# Patient Record
Sex: FEMALE | Race: WHITE | NOT HISPANIC OR LATINO | Employment: OTHER | URBAN - NONMETROPOLITAN AREA
[De-identification: names, ages, dates, MRNs, and addresses within clinical notes are randomized per-mention and may not be internally consistent; named-entity substitution may affect disease eponyms.]

---

## 2017-01-30 ENCOUNTER — TELEPHONE (OUTPATIENT)
Dept: OPHTHALMOLOGY | Facility: CLINIC | Age: 82
End: 2017-01-30

## 2017-01-30 DIAGNOSIS — H40.1130 PRIMARY OPEN ANGLE GLAUCOMA OF BOTH EYES, UNSPECIFIED GLAUCOMA STAGE: Primary | ICD-10-CM

## 2017-01-30 RX ORDER — LATANOPROST 50 UG/ML
1 SOLUTION/ DROPS OPHTHALMIC NIGHTLY
Qty: 2.5 ML | Refills: 11 | Status: SHIPPED | OUTPATIENT
Start: 2017-01-30

## 2017-08-23 ENCOUNTER — OFFICE VISIT (OUTPATIENT)
Dept: CARDIOLOGY | Facility: CLINIC | Age: 82
End: 2017-08-23

## 2017-08-23 VITALS
HEART RATE: 71 BPM | DIASTOLIC BLOOD PRESSURE: 90 MMHG | WEIGHT: 180 LBS | SYSTOLIC BLOOD PRESSURE: 140 MMHG | BODY MASS INDEX: 29.99 KG/M2 | HEIGHT: 65 IN

## 2017-08-23 DIAGNOSIS — I10 ESSENTIAL HYPERTENSION: Primary | ICD-10-CM

## 2017-08-23 DIAGNOSIS — E78.00 PURE HYPERCHOLESTEROLEMIA: ICD-10-CM

## 2017-08-23 PROCEDURE — 99213 OFFICE O/P EST LOW 20 MIN: CPT | Performed by: INTERNAL MEDICINE

## 2017-08-23 RX ORDER — CLOPIDOGREL BISULFATE 75 MG/1
75 TABLET ORAL DAILY
Qty: 30 TABLET | Refills: 11 | Status: SHIPPED | OUTPATIENT
Start: 2017-08-23 | End: 2018-08-23 | Stop reason: SDUPTHER

## 2017-08-23 RX ORDER — ASPIRIN 81 MG/1
81 TABLET, CHEWABLE ORAL DAILY
COMMUNITY
End: 2022-06-07

## 2017-08-23 NOTE — PROGRESS NOTES
[facility] Cardiology  OFFICE NOTE    Karen Conteh  89 y.o. female    08/23/2017  1. Essential hypertension    2. Pure hypercholesterolemia        Chief complaint -Generalized aches and pains with problems with cervical spondylosis and radiating pain to the left side of the chest      History of present Illness- 89-year-old lady who has generalized arthritis with cervical spondylosis has aches and pains all over the body.  She had a cardiac catheterization 2008 which showed noncritical coronary disease.  She is on multiple medicines including hydrocodone for her severe arthritis.  Her pain is mainly coming from the back radiating to the left side lower side of the left breast.  It is not associated with activity does predominantly when she moves in certain positions and it also sometimes radiates to her neck and also to the head like a neuropathy type of pain.              Allergies   Allergen Reactions   • Spironolactone    • Tetracyclines & Related      SWEATS         Past Medical History:   Diagnosis Date   • Achilles tendinitis    • Acute atopic conjunctivitis    • Artificial lens in position    • Calcaneal spur    • Equinus contracture of the ankle    • Essential hypertension    • Fibrocystic disease of breast    • Gastroesophageal reflux disease    • Glaucoma    • Hypernatremia    • Keratoconjunctivitis sicca    • Myopia    • needs influenza immunization    • Nonexudative age-related macular degeneration    • Nuclear cataract    • Osteoporosis    • Plantar fasciitis    • Primary open angle glaucoma    • Pseudophakia    • 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 left eye   • ENDOSCOPY      Dysphagia, non-obstructive. Gastroesophageal reflux without esophagitis. Erythema of the arytenoid folds. Chronic gastritis without bleeding   • REPLACEMENT  TOTAL KNEE     • TOTAL ABDOMINAL HYSTERECTOMY WITH SALPINGO OOPHORECTOMY      Dilatation and curettage; total abdominal hysterectomy; bilateral salpingo-oophorectomy and appendectomy         Family History   Problem Relation Age of Onset   • Cancer Other    • Diabetes Other    • Heart disease Other    • Hypertension Other          Social History     Social History   • Marital status:      Spouse name: N/A   • Number of children: N/A   • Years of education: N/A     Occupational History   • Not on file.     Social History Main Topics   • Smoking status: Unknown If Ever Smoked   • Smokeless tobacco: Never Used   • Alcohol use No   • Drug use: No   • Sexual activity: Defer     Other Topics Concern   • Not on file     Social History Narrative         Current Outpatient Prescriptions   Medication Sig Dispense Refill   • aspirin 81 MG chewable tablet Chew 81 mg Daily.     • Calcium Carbonate Antacid (TUMS PO) Take 2 tablets by mouth as needed.     • Calcium Carbonate-Vitamin D (CALTRATE 600+D PO) Take 1 tablet by mouth 2 (two) times a day.     • diphenhydrAMINE (BENADRYL) 25 mg capsule Take 25 mg by mouth daily as needed for itching.     • escitalopram (LEXAPRO) 10 MG tablet Take 10 mg by mouth every night.     • ferrous sulfate 325 (65 FE) MG tablet Take 325 mg by mouth daily.     • fluticasone (FLONASE) 50 MCG/ACT nasal spray into each nostril. Administer 2 sprays in each nostril for each dose.     • HYDROcodone-acetaminophen (NORCO) 7.5-325 MG per tablet Take 1 tablet by mouth every 3 (three) hours as needed for moderate pain (4-6).     • latanoprost (XALATAN) 0.005 % ophthalmic solution Administer 1 drop to both eyes Every Night. 2.5 mL 11   • levothyroxine (SYNTHROID, LEVOTHROID) 25 MCG tablet Take 25 mcg by mouth daily.     • LORazepam (ATIVAN) 1 MG tablet Take 1 mg by mouth 2 (two) times a day as needed for anxiety.     • olopatadine (PATADAY) 0.2 % solution ophthalmic solution 1 drop daily.     • pravastatin  "(PRAVACHOL) 20 MG tablet Take 20 mg by mouth every night.     • Psyllium (METAMUCIL PO) Take  by mouth as needed.     • ranitidine (ZANTAC) 150 MG tablet Take 150 mg by mouth 2 (two) times a day.     • valsartan (DIOVAN) 80 MG tablet Take 80 mg by mouth daily.     • clopidogrel (PLAVIX) 75 MG tablet Take 1 tablet by mouth Daily. 30 tablet 11     No current facility-administered medications for this visit.          Review of Systems     Constitution: Denies any fatigue, fever or chills    HENT: Has hearing impairment and uses hearing aids    Eyes: Denies any blurring of vision, or photophobia     Cardivascular - As per history of present illness     Respiratory system-Shortness of breath       Endocrine:  Hyperlipidemia and hypothyroidism with osteoporosis       Musculoskeletal:  history of arthritis generalized    Gastrointestinal: No nausea, vomiting, or melena    Genitourinary: No dysuria or hematuria    Neurological:   No history of seizure disorder, stroke, memory problems    Psychiatric/Behavioral:        No history of depression,  No history of bipolar disorder or schizophrenia     Hematological- no history of easy bruising or any bleeding diathesis            OBJECTIVE    /90  Pulse 71  Ht 65\" (165.1 cm)  Wt 180 lb (81.6 kg)  BMI 29.95 kg/m2      Physical Exam     Constitutional: is oriented to person, place, and time.     Skin-warm and dry    Well developed and nourished in no acute distress      Head: Normocephalic and atraumatic.     Neck: Neck supple. No bruit in the carotids,    Cardiovascular: [place] in the fifth intercostal space   Regular rate, and  Rhythm,    S1 greater than S2, no S3 or S4, no gallop     Pulmonary/Chest:   Air  Entry is equal on both sides  No wheezing or crackles,      Abdominal: Soft.  No hepatosplenomegaly, bowel sounds are present    Musculoskeletal: No kyphoscoliosis, no significant thickening of the joints    Neurological: is alert and oriented to person, place, and " time.    cranial nerve are intact .   No motor or sensory deficit    Extremities-no edema, no radial femoral delay      Psychiatric: He has a normal mood and affect.                  His behavior is normal.           Procedures                   A/P    Hypertension well controlled with Diovan 80 mg and takes pravastatin for hyperlipidemia.    Hypothyroidism on Synthroid supplements.  She has generalized aches and pains with radiating pain from the neck and back, considering her advanced age and since no evidence of unstable angina symptoms more possibly related to arthritis I discussed with her daughter and plan for more of conservative medical therapy.  And going to refer her to physical therapy for her aches and pains in the joints.    She is on aspirin and statins along with Plavix.    Follow-up in 1 year              This document has been electronically signed by Addy Lucas MD on August 23, 2017 2:20 PM       EMR Dragon/Transcription disclaimer:   Some of this note may be an electronic transcription/translation of spoken language to printed text. The electronic translation of spoken language may permit erroneous, or at times, nonsensical words or phrases to be inadvertently transcribed; Although I have reviewed the note for such errors, some may still exist.

## 2017-10-30 ENCOUNTER — CLINICAL SUPPORT (OUTPATIENT)
Dept: AUDIOLOGY | Facility: CLINIC | Age: 82
End: 2017-10-30

## 2017-10-30 DIAGNOSIS — Z46.1 ENCOUNTER FOR FITTING OR ADJUSTMENT OF HEARING AID: Primary | ICD-10-CM

## 2017-10-30 PROCEDURE — HEARINGNOCHG: Performed by: AUDIOLOGIST

## 2017-10-31 NOTE — PROGRESS NOTES
HEARING AID CHECK    Name:  Karen Conteh  :  1928  Age:  89 y.o.  Date of Evaluation:  10/31/2017      HISTORY    Reason for visit:  Karen Conteh is seen today for a hearing aid check.  Patient reports that her left hearing aid isn't sounding as loud as it used to.    Hearing aid history:  Patient is currently wearing a [place] (ITC) hearing aid in left ear(s).     OFFICE VISIT    During today's visit the hearing aid was cleaned and checked.  A new wax trap was put on the device.  The patient's hearing aid volume was increased overall about 6 steps and MPO was also increased.  Patient was informed that her hearing aid may not be adjusted much louder that it currently is as its close to maximum volume.  Otoscopy was completed and it was noted that her right ear had cerumen impaction.  It was suggested that the patient follow-up with an ENT for cerumen removal.  The patient will also follow-up with audiology that day for a hearing test and to adjust the hearing aid to her new test.    It was a pleasure seeing Karen Conteh in Audiology today.  It is a pleasure helping Ms. Conteh with her amplification needs.              This document has been electronically signed by GIOVANA Mai on 2017 8:42 AM        GIOVANA Mai  Licensed Audiologist    For Billing and Codin  Hearing Aid Check, Monaural - no charge

## 2017-11-20 ENCOUNTER — OFFICE VISIT (OUTPATIENT)
Dept: OTOLARYNGOLOGY | Facility: CLINIC | Age: 82
End: 2017-11-20

## 2017-11-20 ENCOUNTER — CLINICAL SUPPORT (OUTPATIENT)
Dept: AUDIOLOGY | Facility: CLINIC | Age: 82
End: 2017-11-20

## 2017-11-20 VITALS — HEIGHT: 65 IN | WEIGHT: 189 LBS | BODY MASS INDEX: 31.49 KG/M2 | TEMPERATURE: 97.9 F

## 2017-11-20 DIAGNOSIS — H61.21 IMPACTED CERUMEN OF RIGHT EAR: ICD-10-CM

## 2017-11-20 DIAGNOSIS — H90.3 SENSORINEURAL HEARING LOSS, BILATERAL: Primary | ICD-10-CM

## 2017-11-20 DIAGNOSIS — H90.3 SENSORINEURAL HEARING LOSS OF BOTH EARS: Primary | ICD-10-CM

## 2017-11-20 PROCEDURE — 69210 REMOVE IMPACTED EAR WAX UNI: CPT | Performed by: OTOLARYNGOLOGY

## 2017-11-20 PROCEDURE — 99203 OFFICE O/P NEW LOW 30 MIN: CPT | Performed by: OTOLARYNGOLOGY

## 2017-11-20 RX ORDER — LOTEPREDNOL ETABONATE 5 MG/G
GEL OPHTHALMIC 4 TIMES DAILY
COMMUNITY
End: 2018-01-23

## 2017-11-20 RX ORDER — LANOLIN ALCOHOL/MO/W.PET/CERES
1000 CREAM (GRAM) TOPICAL DAILY
Status: ON HOLD | COMMUNITY
End: 2018-01-30

## 2017-11-20 RX ORDER — CLOTRIMAZOLE AND BETAMETHASONE DIPROPIONATE 10; .64 MG/G; MG/G
CREAM TOPICAL 2 TIMES DAILY
COMMUNITY
End: 2018-11-26

## 2017-11-20 RX ORDER — DEXLANSOPRAZOLE 60 MG/1
CAPSULE, DELAYED RELEASE ORAL
COMMUNITY
Start: 2017-08-25 | End: 2018-01-23

## 2017-11-20 NOTE — PROGRESS NOTES
Subjective   Karen Conteh is a 89 y.o. female.   CC: hearing loss and cerumen impaction  History of Present Illness   Patient has many years of hearing loss is noted to have cerumen impaction she's been provided with a hearing aid in her left ear comes in for hearing testing.  It's unclear from her history as to why this 89-year-old patient has her hearing loss but apparently spell worked up and that within the past.  She's not complaining of dizziness or tinnitus currently..  No recent infections no known history of ear surgery or head trauma she's very hard of hearing is hard to get a history in terms of ototoxic drugs of that sort of thing.  The hearing loss is been chronic and severe for a long time      The following portions of the patient's history were reviewed and updated as appropriate: allergies, current medications, past family history, past medical history, past social history, past surgical history and problem list.      Karen Conteh reports that she has never smoked. She has never used smokeless tobacco. She reports that she does not drink alcohol or use illicit drugs.  Patient is not a tobacco user and has not been counseled for use of tobacco products    Family History   Problem Relation Age of Onset   • Cancer Other    • Diabetes Other    • Heart disease Other    • Hypertension Other          Current Outpatient Prescriptions:   •  aspirin 81 MG chewable tablet, Chew 81 mg Daily., Disp: , Rfl:   •  clopidogrel (PLAVIX) 75 MG tablet, Take 1 tablet by mouth Daily., Disp: 30 tablet, Rfl: 11  •  clotrimazole-betamethasone (LOTRISONE) 1-0.05 % cream, Apply  topically 2 (Two) Times a Day., Disp: , Rfl:   •  dexlansoprazole (DEXILANT) 60 MG capsule, TAKE (1) CAPSULE BY MOUTH ONCE DAILY IF NEEDED., Disp: , Rfl:   •  escitalopram (LEXAPRO) 10 MG tablet, Take 10 mg by mouth every night., Disp: , Rfl:   •  ferrous sulfate 325 (65 FE) MG tablet, Take 325 mg by mouth daily., Disp: , Rfl:   •  fluticasone  (FLONASE) 50 MCG/ACT nasal spray, into each nostril. Administer 2 sprays in each nostril for each dose., Disp: , Rfl:   •  latanoprost (XALATAN) 0.005 % ophthalmic solution, Administer 1 drop to both eyes Every Night., Disp: 2.5 mL, Rfl: 11  •  levothyroxine (SYNTHROID, LEVOTHROID) 25 MCG tablet, Take 25 mcg by mouth daily., Disp: , Rfl:   •  LORazepam (ATIVAN) 1 MG tablet, Take 1 mg by mouth 2 (two) times a day as needed for anxiety., Disp: , Rfl:   •  loteprednol etabonate (LOTEMAX) 0.5 % gel ophthalmic gel, Apply  to eye 4 (Four) Times a Day., Disp: , Rfl:   •  Multiple Vitamins-Minerals (MULTIVITAMIN ADULT PO), Take  by mouth., Disp: , Rfl:   •  olopatadine (PATADAY) 0.2 % solution ophthalmic solution, 1 drop daily., Disp: , Rfl:   •  pravastatin (PRAVACHOL) 20 MG tablet, Take 20 mg by mouth every night., Disp: , Rfl:   •  ranitidine (ZANTAC) 150 MG tablet, Take 150 mg by mouth 2 (two) times a day., Disp: , Rfl:   •  valsartan (DIOVAN) 80 MG tablet, Take 80 mg by mouth daily., Disp: , Rfl:   •  vitamin B-12 (CYANOCOBALAMIN) 1000 MCG tablet, Take 1,000 mcg by mouth Daily., Disp: , Rfl:     Allergies   Allergen Reactions   • Spironolactone    • Tetracyclines & Related      SWEATS       Past Medical History:   Diagnosis Date   • Achilles tendinitis    • Acute atopic conjunctivitis    • Artificial lens in position    • Calcaneal spur    • Equinus contracture of the ankle    • Essential hypertension    • Fibrocystic disease of breast    • Gastroesophageal reflux disease    • Glaucoma    • Hypernatremia    • Keratoconjunctivitis sicca    • Myopia    • needs influenza immunization    • Nonexudative age-related macular degeneration    • Nuclear cataract    • Osteoporosis    • Plantar fasciitis    • Primary open angle glaucoma    • Pseudophakia    • Stroke     ministrokes   • 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   HENT: Positive for hearing loss  and tinnitus. Negative for ear discharge and ear pain.    Neurological: Negative for dizziness.   Hematological: Negative for adenopathy.   All other systems reviewed and are negative.          Objective   Physical Exam   Constitutional: She is oriented to person, place, and time. She appears well-developed and well-nourished.   HENT:   Head: Normocephalic and atraumatic.   Right Ear: Hearing, tympanic membrane and external ear normal.   Left Ear: Hearing, tympanic membrane and external ear normal.   Ears:    Nose: Nose normal. No mucosal edema, rhinorrhea, nasal deformity or septal deviation. No epistaxis. Right sinus exhibits no maxillary sinus tenderness and no frontal sinus tenderness. Left sinus exhibits no maxillary sinus tenderness and no frontal sinus tenderness.   Mouth/Throat: Uvula is midline, oropharynx is clear and moist and mucous membranes are normal. No trismus in the jaw. Normal dentition. No oropharyngeal exudate or posterior oropharyngeal edema. Tonsils are 1+ on the right. Tonsils are 1+ on the left. No tonsillar exudate.   Eyes: Conjunctivae are normal.   Neck: Normal range of motion. Neck supple. No JVD present. No tracheal deviation present. No thyromegaly present.   Cardiovascular: Normal rate.    Pulmonary/Chest: Effort normal.   Musculoskeletal: Normal range of motion.   Lymphadenopathy:        Head (right side): No submental, no submandibular, no tonsillar, no preauricular, no posterior auricular and no occipital adenopathy present.        Head (left side): No submental, no submandibular, no tonsillar, no preauricular, no posterior auricular and no occipital adenopathy present.     She has no cervical adenopathy.        Right cervical: No superficial cervical, no deep cervical and no posterior cervical adenopathy present.       Left cervical: No superficial cervical, no deep cervical and no posterior cervical adenopathy present.   Neurological: She is alert and oriented to person, place,  and time. No cranial nerve deficit.   Skin: Skin is warm.   Psychiatric: She has a normal mood and affect. Her speech is normal and behavior is normal. Thought content normal.   Nursing note and vitals reviewed.      Procedure note cerumen impaction cleaning was done in both ears to the right was much worse in the left large amount of wax removed could not be removed the suction to multiple forceps were used with microscopic control in the ear is cleaned atraumatically without evidence complications.  The audiogram and tympanogram were reviewed with the patient and her family with near total deafness and sensorineural hearing loss and tympanograms were shown tracings explained as well.    Assessment/Plan   Karen was seen today for cerumen impaction.    Diagnoses and all orders for this visit:    Sensorineural hearing loss of both ears    Impacted cerumen of right ear      We discussed strategies were preventing wax buildup especially in light of her severe sensorineural hearing loss.  She was to continue using hearing aid.  The only other options would be  a cochlear implant but since she's functioning well with area of that would be something as a last resort.  She is to avoid loud noises.    See her back in 6 months if there is any problems or questions or to let us know all questions were answered and she her family agree with this approach.

## 2017-11-21 NOTE — PROGRESS NOTES
STANDARD AUDIOMETRIC EVALUATION      Name:  Karen Conteh  :  1928  Age:  89 y.o.  Date of Evaluation:  2017      HISTORY    Reason for visit:  Karen Conteh is seen today for a hearing evaluation at the request of Dr. Brent Diaz.  Patient reports that she is having more trouble hearing.      EVALUATION    See Audiogram    RESULTS        Otoscopy and Tympanometry 226 Hz :  Right Ear:  Otoscopy:  Testing completed after ears were cleaned          Tympanometry:  Middle ear function within normal limits    Left Ear:   Otoscopy:  Testing completed after ears were cleaned        Tympanometry:  Middle ear function within normal limits    Test technique:  Standard Audiometry     Pure Tone Audiometry:   Patient responded to pure tones at 40-95 dB for 250-8000 Hz in right ear, and at 40-85 dB for 250-8000 Hz in left ear.       Speech Audiometry:        Right Ear: Speech Detection Threshold was recorded at 75 dB.  Speech Discrimination was unable to be completed due to patient's inability to understand speech.                       Left Ear:  Speech Reception Threshold (SRT) was obtained at 55 dBHL                 Speech Discrimination scores were 16% in quiet when words were presented at 85 dBHL    Reliability:   good    IMPRESSIONS:  1.  Tympanometry results are consistent with Middle ear function within normal limits in both ears.  2.  Pure tone results are consistent with mild to severe sloping sensorineural hearing loss for both ears.       RECOMMENDATIONS:  Patient is seeing the Ear Nose and Throat physician immediately following this examination.  It was a pleasure seeing Karen Conteh in Audiology today.  We would be happy to do further testing or discuss these test as necessary.          This document has been electronically signed by GIOVANA Mai on 2017 2:15 PM          GIOVANA Mai  Licensed Audiologist

## 2018-01-23 RX ORDER — TIMOLOL MALEATE 5 MG/ML
1 SOLUTION/ DROPS OPHTHALMIC DAILY
COMMUNITY

## 2018-01-30 ENCOUNTER — ANESTHESIA EVENT (OUTPATIENT)
Dept: GASTROENTEROLOGY | Facility: HOSPITAL | Age: 83
End: 2018-01-30

## 2018-01-30 ENCOUNTER — HOSPITAL ENCOUNTER (OUTPATIENT)
Facility: HOSPITAL | Age: 83
Setting detail: HOSPITAL OUTPATIENT SURGERY
Discharge: HOME | End: 2018-01-30
Attending: INTERNAL MEDICINE | Admitting: INTERNAL MEDICINE

## 2018-01-30 ENCOUNTER — ANESTHESIA (OUTPATIENT)
Dept: GASTROENTEROLOGY | Facility: HOSPITAL | Age: 83
End: 2018-01-30

## 2018-01-30 VITALS
HEART RATE: 57 BPM | DIASTOLIC BLOOD PRESSURE: 82 MMHG | SYSTOLIC BLOOD PRESSURE: 138 MMHG | TEMPERATURE: 96.3 F | OXYGEN SATURATION: 96 % | WEIGHT: 185 LBS | HEIGHT: 65 IN | BODY MASS INDEX: 30.82 KG/M2 | RESPIRATION RATE: 18 BRPM

## 2018-01-30 PROCEDURE — 25010000002 PROPOFOL 10 MG/ML EMULSION: Performed by: NURSE ANESTHETIST, CERTIFIED REGISTERED

## 2018-01-30 RX ORDER — ONDANSETRON 2 MG/ML
4 INJECTION INTRAMUSCULAR; INTRAVENOUS ONCE AS NEEDED
Status: DISCONTINUED | OUTPATIENT
Start: 2018-01-30 | End: 2018-01-30 | Stop reason: HOSPADM

## 2018-01-30 RX ORDER — PROMETHAZINE HYDROCHLORIDE 25 MG/1
25 SUPPOSITORY RECTAL ONCE AS NEEDED
Status: DISCONTINUED | OUTPATIENT
Start: 2018-01-30 | End: 2018-01-30 | Stop reason: HOSPADM

## 2018-01-30 RX ORDER — PROMETHAZINE HYDROCHLORIDE 25 MG/ML
12.5 INJECTION, SOLUTION INTRAMUSCULAR; INTRAVENOUS ONCE AS NEEDED
Status: DISCONTINUED | OUTPATIENT
Start: 2018-01-30 | End: 2018-01-30 | Stop reason: HOSPADM

## 2018-01-30 RX ORDER — DEXTROSE AND SODIUM CHLORIDE 5; .45 G/100ML; G/100ML
20 INJECTION, SOLUTION INTRAVENOUS CONTINUOUS
Status: DISCONTINUED | OUTPATIENT
Start: 2018-01-30 | End: 2018-01-30 | Stop reason: HOSPADM

## 2018-01-30 RX ORDER — PROMETHAZINE HYDROCHLORIDE 25 MG/1
25 TABLET ORAL ONCE AS NEEDED
Status: DISCONTINUED | OUTPATIENT
Start: 2018-01-30 | End: 2018-01-30 | Stop reason: HOSPADM

## 2018-01-30 RX ORDER — LIDOCAINE HYDROCHLORIDE 10 MG/ML
INJECTION, SOLUTION INFILTRATION; PERINEURAL AS NEEDED
Status: DISCONTINUED | OUTPATIENT
Start: 2018-01-30 | End: 2018-01-30 | Stop reason: SURG

## 2018-01-30 RX ORDER — PROPOFOL 10 MG/ML
VIAL (ML) INTRAVENOUS AS NEEDED
Status: DISCONTINUED | OUTPATIENT
Start: 2018-01-30 | End: 2018-01-30 | Stop reason: SURG

## 2018-01-30 RX ADMIN — PROPOFOL 20 MG: 10 INJECTION, EMULSION INTRAVENOUS at 08:29

## 2018-01-30 RX ADMIN — DEXTROSE AND SODIUM CHLORIDE 20 ML/HR: 5; 450 INJECTION, SOLUTION INTRAVENOUS at 08:13

## 2018-01-30 RX ADMIN — LIDOCAINE HYDROCHLORIDE 60 MG: 10 INJECTION, SOLUTION INFILTRATION; PERINEURAL at 08:27

## 2018-01-30 RX ADMIN — PROPOFOL 10 MG: 10 INJECTION, EMULSION INTRAVENOUS at 08:27

## 2018-01-30 RX ADMIN — PROPOFOL 70 MG: 10 INJECTION, EMULSION INTRAVENOUS at 08:23

## 2018-01-30 NOTE — ANESTHESIA PREPROCEDURE EVALUATION
Anesthesia Evaluation     NPO Solid Status: > 8 hours  NPO Liquid Status: > 8 hours     Airway   no difficulty expected  Dental      Pulmonary    Cardiovascular     (+) hypertension well controlled, PVD,       Neuro/Psych  (+) CVA,     GI/Hepatic/Renal/Endo    (+)  GERD well controlled,     Musculoskeletal     Abdominal    Substance History      OB/GYN          Other                                                Anesthesia Plan    ASA 3     MAC     intravenous induction   Anesthetic plan and risks discussed with patient.    Plan discussed with CRNA.

## 2018-01-30 NOTE — ANESTHESIA POSTPROCEDURE EVALUATION
Patient: Karen Conteh    Procedure Summary     Date Anesthesia Start Anesthesia Stop Room / Location    01/30/18 0823 0838 [facility] ENDOSCOPY 2 / [facility] ENDOSCOPY       Procedure Diagnosis Surgeon Provider    ESOPHAGOGASTRODUODENOSCOPY (N/A Esophagus) Esophageal stenosis; Hiatal hernia; Gastritis  (Esophageal stenosis [K22.2]; Diffuse esophageal spasm [K22.4]) Jude Hewitt, DO Michi Howard CRNA          Anesthesia Type: MAC  Last vitals  BP   (!) 201/95 (01/30/18 0756)   Temp   96.8 °F (36 °C) (01/30/18 0756)   Pulse   100 (01/30/18 0756)   Resp   20 (01/30/18 0756)     SpO2   93 % (01/30/18 0756)     Post Anesthesia Care and Evaluation    Patient location during evaluation: PACU  Patient participation: complete - patient participated  Level of consciousness: awake and alert  Pain score: 1  Pain management: adequate  Airway patency: patent  Anesthetic complications: No anesthetic complications  PONV Status: none  Cardiovascular status: acceptable  Respiratory status: acceptable  Hydration status: acceptable

## 2018-05-21 ENCOUNTER — OFFICE VISIT (OUTPATIENT)
Dept: OTOLARYNGOLOGY | Facility: CLINIC | Age: 83
End: 2018-05-21

## 2018-05-21 VITALS — HEIGHT: 65 IN | TEMPERATURE: 96.9 F | WEIGHT: 186 LBS | BODY MASS INDEX: 30.99 KG/M2

## 2018-05-21 DIAGNOSIS — H90.3 SENSORINEURAL HEARING LOSS OF BOTH EARS: ICD-10-CM

## 2018-05-21 DIAGNOSIS — H61.21 IMPACTED CERUMEN OF RIGHT EAR: Primary | ICD-10-CM

## 2018-05-21 PROCEDURE — 69210 REMOVE IMPACTED EAR WAX UNI: CPT | Performed by: OTOLARYNGOLOGY

## 2018-05-21 PROCEDURE — 99212 OFFICE O/P EST SF 10 MIN: CPT | Performed by: OTOLARYNGOLOGY

## 2018-05-21 NOTE — PATIENT INSTRUCTIONS

## 2018-08-23 ENCOUNTER — OFFICE VISIT (OUTPATIENT)
Dept: CARDIOLOGY | Facility: CLINIC | Age: 83
End: 2018-08-23

## 2018-08-23 VITALS
SYSTOLIC BLOOD PRESSURE: 138 MMHG | DIASTOLIC BLOOD PRESSURE: 78 MMHG | HEART RATE: 70 BPM | BODY MASS INDEX: 30.82 KG/M2 | OXYGEN SATURATION: 98 % | HEIGHT: 65 IN | WEIGHT: 185 LBS

## 2018-08-23 DIAGNOSIS — E78.00 PURE HYPERCHOLESTEROLEMIA: ICD-10-CM

## 2018-08-23 DIAGNOSIS — I10 ESSENTIAL HYPERTENSION: Primary | ICD-10-CM

## 2018-08-23 PROCEDURE — 99214 OFFICE O/P EST MOD 30 MIN: CPT | Performed by: INTERNAL MEDICINE

## 2018-08-23 RX ORDER — LOSARTAN POTASSIUM 100 MG/1
100 TABLET ORAL DAILY
Qty: 30 TABLET | Refills: 12 | Status: SHIPPED | OUTPATIENT
Start: 2018-08-23 | End: 2019-03-20 | Stop reason: SDUPTHER

## 2018-08-23 RX ORDER — CLOPIDOGREL BISULFATE 75 MG/1
TABLET ORAL
Qty: 30 TABLET | Refills: 6 | Status: SHIPPED | OUTPATIENT
Start: 2018-08-23 | End: 2019-01-28

## 2018-08-23 NOTE — PROGRESS NOTES
[facility] Cardiology  OFFICE NOTE    Karen Conteh  89 y.o. female    08/23/2018  1. Essential hypertension    2. Pure hypercholesterolemia        Chief complaint -follow up hypertension    History of present Illness- 89-year-old lady who is otherwise healthy has history of TIAs in the past and his history of hypertension and hyperlipidemia.  She is on aspirin and Plavix because of multiple recurrent TIAs and she was tried on Xarelto and could not tolerate it due to bleeding.  She is currently doing okay she has hearing impairment and she has generalized aches and pains possibly related to arthritis plus fibromyalgia.  She gets around pretty okay as per the daughter and her memory is okay.              Allergies   Allergen Reactions   • Spironolactone Other (See Comments)     Daughter doesn't remember   • Tetracyclines & Related      SWEATS   • Xarelto [Rivaroxaban] Other (See Comments)     hematoma         Past Medical History:   Diagnosis Date   • Achilles tendinitis    • Acute atopic conjunctivitis    • Artificial lens in position    • Calcaneal spur    • Equinus contracture of the ankle    • Essential hypertension    • Fibrocystic disease of breast    • Gastroesophageal reflux disease    • Glaucoma    • Hypernatremia    • Keratoconjunctivitis sicca (CMS/HCC)    • Myopia    • needs influenza immunization    • Nonexudative age-related macular degeneration    • Nuclear cataract    • Osteoporosis    • Plantar fasciitis    • Primary open angle glaucoma    • Pseudophakia    • Stroke (CMS/HCC)     ministrokes   • 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 left eye   • ENDOSCOPY      Dysphagia, non-obstructive. Gastroesophageal reflux without esophagitis. Erythema of the arytenoid folds. Chronic gastritis without bleeding   • ENDOSCOPY N/A 1/30/2018     Procedure: ESOPHAGOGASTRODUODENOSCOPY;  Surgeon: Jude Hewitt DO;  Location: [facility] ENDOSCOPY;  Service:    • REPLACEMENT TOTAL KNEE     • TOTAL ABDOMINAL HYSTERECTOMY WITH SALPINGO OOPHORECTOMY      Dilatation and curettage; total abdominal hysterectomy; bilateral salpingo-oophorectomy and appendectomy         Family History   Problem Relation Age of Onset   • Cancer Other    • Diabetes Other    • Heart disease Other    • Hypertension Other          Social History     Social History   • Marital status:      Spouse name: N/A   • Number of children: N/A   • Years of education: N/A     Occupational History   • Not on file.     Social History Main Topics   • Smoking status: Never Smoker   • Smokeless tobacco: Never Used   • Alcohol use No   • Drug use: No   • Sexual activity: Defer     Other Topics Concern   • Not on file     Social History Narrative   • No narrative on file         Current Outpatient Prescriptions   Medication Sig Dispense Refill   • aspirin 81 MG chewable tablet Chew 81 mg Daily.     • clopidogrel (PLAVIX) 75 MG tablet Take 1 tablet by mouth Daily. 30 tablet 11   • clotrimazole-betamethasone (LOTRISONE) 1-0.05 % cream Apply  topically 2 (Two) Times a Day.     • Dexlansoprazole (DEXILANT PO) Take 60 mg by mouth Daily.     • escitalopram (LEXAPRO) 10 MG tablet Take 10 mg by mouth every night.     • fluticasone (FLONASE) 50 MCG/ACT nasal spray into each nostril. Administer 2 sprays in each nostril for each dose.     • latanoprost (XALATAN) 0.005 % ophthalmic solution Administer 1 drop to both eyes Every Night. 2.5 mL 11   • levothyroxine (SYNTHROID, LEVOTHROID) 25 MCG tablet Take 25 mcg by mouth daily.     • LORazepam (ATIVAN) 1 MG tablet Take 0.5 mg by mouth 2 (Two) Times a Day As Needed for Anxiety.     • Multiple Vitamins-Minerals (MULTIVITAMIN ADULT PO) Take  by mouth.     • olopatadine (PATADAY) 0.2 % solution ophthalmic solution 1 drop daily.     • pravastatin (PRAVACHOL) 20 MG tablet  "Take 20 mg by mouth every night.     • ranitidine (ZANTAC) 150 MG tablet Take 150 mg by mouth 2 (two) times a day.     • timolol (TIMOPTIC) 0.5 % ophthalmic solution Administer 1 drop into the left eye Daily.     • losartan (COZAAR) 100 MG tablet Take 1 tablet by mouth Daily. 30 tablet 12     No current facility-administered medications for this visit.          Review of Systems     Constitution: Denies any fatigue, fever or chills    HENT: impairment has hearing aids    Eyes: Denies any blurring of vision, or photophobia     Cardivascular - As per history of present illness     Respiratory system- was of breath NYHA class II     Endocrine:   history of hyperlipidemia,r Hypothyroidism                       Musculoskeletal:   history of arthritis with musculoskeletal problems    Gastrointestinal: History of GERD    Genitourinary: No dysuria or hematuria    Neurological:    TIAs in the past    Psychiatric/Behavioral:         history of depression    Hematological- bleeding with Xarelto            OBJECTIVE    /78   Pulse 70   Ht 165.1 cm (65\")   Wt 83.9 kg (185 lb)   SpO2 98%   BMI 30.79 kg/m²       Physical Exam     Constitutional: is oriented to person, place, and time.     Skin-warm and dry    Well developed and nourished in no acute distress      Head: Normocephalic and atraumatic.     Eyes: Pupils are equal    Neck: Neck supple. No bruit in the carotids    Cardiovascular: [place] in the fifth intercostal space   Regular rate, and  Rhythm,    S1 greater than S2,    Pulmonary/Chest:   Air  Entry is equal on both sides  No wheezing or crackles,      Abdominal: Soft.  No hepatosplenomegaly    Musculoskeletal:   Arthritis of the joints    Neurological: is alert and oriented to person, place, and time.    cranial nerve are intact .   No motor or sensory deficit    Extremities-no edema, no radial femoral delay            Procedures        A/P    History of TIAs on aspirin and Plavix doing well, she had one " episode of fall about a month ago as per the daughter she has been doing well does not want to do any CAT scan at this point.    Hypertension controlled with losartan.    Hyperlipidemia on pravastatin doing okay.    Significant GERD on proton pump inhibitors and ranitidine    Follow-up in 1 year            This document has been electronically signed by Addy Lucas MD on August 23, 2018 11:07 AM       EMR Dragon/Transcription disclaimer:   Some of this note may be an electronic transcription/translation of spoken language to printed text. The electronic translation of spoken language may permit erroneous, or at times, nonsensical words or phrases to be inadvertently transcribed; Although I have reviewed the note for such errors, some may still exist.

## 2018-11-26 ENCOUNTER — OFFICE VISIT (OUTPATIENT)
Dept: OTOLARYNGOLOGY | Facility: CLINIC | Age: 83
End: 2018-11-26

## 2018-11-26 ENCOUNTER — CLINICAL SUPPORT (OUTPATIENT)
Dept: AUDIOLOGY | Facility: CLINIC | Age: 83
End: 2018-11-26

## 2018-11-26 VITALS — TEMPERATURE: 97.2 F | WEIGHT: 186 LBS | BODY MASS INDEX: 30.99 KG/M2 | HEIGHT: 65 IN

## 2018-11-26 DIAGNOSIS — H90.3 SENSORINEURAL HEARING LOSS, BILATERAL: Primary | ICD-10-CM

## 2018-11-26 DIAGNOSIS — H61.21 IMPACTED CERUMEN OF RIGHT EAR: Primary | ICD-10-CM

## 2018-11-26 DIAGNOSIS — H90.3 SENSORINEURAL HEARING LOSS OF BOTH EARS: ICD-10-CM

## 2018-11-26 PROCEDURE — 99213 OFFICE O/P EST LOW 20 MIN: CPT | Performed by: OTOLARYNGOLOGY

## 2018-11-26 PROCEDURE — 69210 REMOVE IMPACTED EAR WAX UNI: CPT | Performed by: OTOLARYNGOLOGY

## 2018-11-26 NOTE — PATIENT INSTRUCTIONS

## 2018-11-26 NOTE — PROGRESS NOTES
Subjective   Karen Conteh is a 89 y.o. female.   Patient thinks hearing worse    History of Present Illness   Is back for review of pain testing is cerumen impaction which had been cleaned prior to testing like she's having more trouble when she had been especially the left she's had a hearing aid for 5 years on that side she says she's [place] the right side she is no otorrhea no particular pain is just mainly hearing loss and wears a hearing aid usually on the left      The following portions of the patient's history were reviewed and updated as appropriate: allergies, current medications, past family history, past medical history, past social history, past surgical history and problem list.      Current Outpatient Medications:   •  aspirin 81 MG chewable tablet, Chew 81 mg Daily., Disp: , Rfl:   •  clopidogrel (PLAVIX) 75 MG tablet, TAKE ONE TABLET BY MOUTH ONCE DAILY, Disp: 30 tablet, Rfl: 6  •  Dexlansoprazole (DEXILANT PO), Take 60 mg by mouth Daily., Disp: , Rfl:   •  escitalopram (LEXAPRO) 10 MG tablet, Take 10 mg by mouth every night., Disp: , Rfl:   •  fluticasone (FLONASE) 50 MCG/ACT nasal spray, into each nostril. Administer 2 sprays in each nostril for each dose., Disp: , Rfl:   •  latanoprost (XALATAN) 0.005 % ophthalmic solution, Administer 1 drop to both eyes Every Night., Disp: 2.5 mL, Rfl: 11  •  levothyroxine (SYNTHROID, LEVOTHROID) 25 MCG tablet, Take 25 mcg by mouth daily., Disp: , Rfl:   •  LORazepam (ATIVAN) 1 MG tablet, Take 0.5 mg by mouth 2 (Two) Times a Day As Needed for Anxiety., Disp: , Rfl:   •  losartan (COZAAR) 100 MG tablet, Take 1 tablet by mouth Daily., Disp: 30 tablet, Rfl: 12  •  Multiple Vitamins-Minerals (MULTIVITAMIN ADULT PO), Take  by mouth., Disp: , Rfl:   •  olopatadine (PATADAY) 0.2 % solution ophthalmic solution, 1 drop daily., Disp: , Rfl:   •  pravastatin (PRAVACHOL) 20 MG tablet, Take 20 mg by mouth every night., Disp: , Rfl:   •  ranitidine (ZANTAC) 150 MG tablet,  Take 150 mg by mouth 2 (two) times a day., Disp: , Rfl:   •  timolol (TIMOPTIC) 0.5 % ophthalmic solution, Administer 1 drop into the left eye Daily., Disp: , Rfl:     Allergies   Allergen Reactions   • Spironolactone Other (See Comments)     Daughter doesn't remember   • Tetracyclines & Related      SWEATS   • Xarelto [Rivaroxaban] Other (See Comments)     hematoma             Review of Systems   HENT: Positive for hearing loss and tinnitus. Negative for ear discharge and ear pain.    Hematological: Negative for adenopathy.           Objective   Physical Exam   Constitutional: She appears well-developed and well-nourished.   HENT:   Head: Normocephalic and atraumatic.   Right Ear: Tympanic membrane and external ear normal.   Left Ear: Tympanic membrane and external ear normal.   Ears:    Nose: Nose normal. No mucosal edema, rhinorrhea, nasal deformity or septal deviation. No epistaxis. Right sinus exhibits no maxillary sinus tenderness and no frontal sinus tenderness. Left sinus exhibits no maxillary sinus tenderness and no frontal sinus tenderness.   Mouth/Throat: Uvula is midline, oropharynx is clear and moist and mucous membranes are normal. No trismus in the jaw. Normal dentition. No oropharyngeal exudate or posterior oropharyngeal edema. Tonsils are 1+ on the right. Tonsils are 1+ on the left. No tonsillar exudate.   Eyes: Conjunctivae are normal.   Neck: Normal range of motion. Neck supple. No JVD present. No tracheal deviation present. No thyromegaly present.   Pulmonary/Chest: Effort normal.   Musculoskeletal: Normal range of motion.   Lymphadenopathy:        Head (right side): No submental, no submandibular, no tonsillar, no preauricular, no posterior auricular and no occipital adenopathy present.        Head (left side): No submental, no submandibular, no tonsillar, no preauricular, no posterior auricular and no occipital adenopathy present.     She has no cervical adenopathy.        Right cervical: No  superficial cervical, no deep cervical and no posterior cervical adenopathy present.       Left cervical: No superficial cervical, no deep cervical and no posterior cervical adenopathy present.   Neurological: She is alert. No cranial nerve deficit.   Skin: Skin is warm.   Psychiatric: She has a normal mood and affect. Her speech is normal and behavior is normal. Thought content normal.   Nursing note and vitals reviewed.  Procedure note cerumen impaction cleaning.  Bilateral cleaning wax was done with use of microscope forceps and suction barge [place] waxes found in both sides and was cleaned atraumatically without complication    AudioGram was reviewed with the patient and her daughter revealing severe hearing loss profound on the right severe on the left normal tympanograms actual tracings were shown to the family patient    Assessment/Plan   Karen was seen today for follow-up.    Diagnoses and all orders for this visit:    Impacted cerumen of right ear    Sensorineural hearing loss of both ears    Discussed avoidance of loud noise    We discussed the importance of keeping Q-tips out of the ear is use of peroxide follow-up 6 months audiologist about new hearing aid      Have prescribed a hearing aids with the patient should consider updating her hearing aid because her hearing is so poor her some squamous low on the left side discussed that severe and profound loss on the right  Use peroxide or ears once a week and we'll recheck her ears in 6 months to minimum maximize the ear remained plugged with wax meantime hearing aid evaluation being set up

## 2018-11-27 NOTE — PROGRESS NOTES
STANDARD AUDIOMETRIC EVALUATION      Name:  Karen Conteh  :  1928  Age:  89 y.o.  Date of Evaluation:  2018      HISTORY    Reason for visit:  Karen Conteh is seen today for a hearing evaluation at the request of Dr. Brent Diaz.  Patient reports she can't hear.  She states she wears a hearing aid only in her left ear.  She reports her hearing loss is worse in her right ear.       EVALUATION    See Audiogram    RESULTS        Otoscopy and Tympanometry 226 Hz :  Right Ear:  Otoscopy:  Cerumen impaction, Testing completed after ears were cleaned          Tympanometry:  Middle ear function within normal limits    Left Ear:   Otoscopy:  Cerumen impaction, Testing completed after ears were cleaned        Tympanometry:  Middle ear function within normal limits    Test technique:  Standard Audiometry     Pure Tone Audiometry:   Patient responded to pure tones at 40-80 dB for 250-8000 Hz in right ear, and at 35-85 dB for 250-8000 Hz in left ear.       Speech Audiometry:        Right Ear:  Speech Reception Threshold (SRT) was obtained at 60 dBHL                 Speech Discrimination scores were 4% in quiet when words were presented at 90 dBHL       Left Ear:  Speech Reception Threshold (SRT) was obtained at 55 dBHL                 Speech Discrimination scores were 40% in quiet when words were presented at 85 dBHL    Reliability:   good    IMPRESSIONS:  1.  Tympanometry results are consistent with Middle ear function within normal limits in both ears.  2.  Pure tone results are consistent with mild to severe sloping sensorineural hearing loss  for both ears.       RECOMMENDATIONS:  Patient is seeing the Ear Nose and Throat physician immediately following this examination.  It was a pleasure seeing Karen Conteh in Audiology today.  We would be happy to do further testing or discuss these test as necessary.          This document has been electronically signed by Kayleen Mercado MS CCC-TIAGO on  November 27, 2018 8:48 AM       Kayleen Mercado MS CCC-A  Licensed Audiologist

## 2018-12-13 ENCOUNTER — CLINICAL SUPPORT (OUTPATIENT)
Dept: AUDIOLOGY | Facility: CLINIC | Age: 83
End: 2018-12-13

## 2018-12-13 DIAGNOSIS — Z46.1 ENCOUNTER FOR FITTING OR ADJUSTMENT OF HEARING AID: Primary | ICD-10-CM

## 2018-12-13 PROCEDURE — HEARINGNOCHG: Performed by: AUDIOLOGIST

## 2018-12-13 NOTE — PROGRESS NOTES
HEARING AID CHECK    Name:  Karen Conteh  :  1928  Age:  89 y.o.  Date of Evaluation:  2018      HISTORY    Reason for visit:  Karen Conteh is seen today for a hearing aid check.  Patient reports she is not hearing well with her hearing aid and asked if a new hearing aid would be any better for her.  She states she can't hear on the phone, and she as to read lips.      Hearing aid history:  Patient is currently wearing a [place] [employer] [place] (ITC) hearing aid in left ear(s). and Current aid(s) 5 1/2 years old.     OFFICE VISIT    During today's visit listening check revealed aid to be in good working condition.  Review of her recent audiogram on 2018 showed very poor word discrimination in her right ear and slightly poor word discrimination in her left ear.  Her hearing aid was reprogrammed to enhance speech sounds.  She was counseled that her current hearing aid is in good working condition.  She was also counseled that due to her poor word discrimination, a brand new hearing aid is not likely to be an improvement over her current hearing aid.      She will try this adjustment and let me know if she needs any further assistance.      It was a pleasure seeing Karen Conteh in Audiology today.  It is a pleasure helping Ms. Conteh with her amplification needs.             This document has been electronically signed by Kayleen Mercado MS CCC-A on 2018 4:31 PM       Kayleen Mercado MS CCC-A  Licensed Audiologist    For Billing and Codin  Hearing Aid Check, Monaural - no charge

## 2019-01-28 RX ORDER — ALENDRONATE SODIUM 35 MG/1
35 TABLET ORAL
COMMUNITY
End: 2020-02-17

## 2019-01-28 RX ORDER — DIPHENHYDRAMINE HCL 25 MG
25 CAPSULE ORAL EVERY 6 HOURS PRN
COMMUNITY
End: 2020-02-17

## 2019-01-28 RX ORDER — CLOPIDOGREL BISULFATE 75 MG/1
75 TABLET ORAL DAILY
COMMUNITY

## 2019-01-28 RX ORDER — CHOLECALCIFEROL (VITAMIN D3) 25 MCG
1 TABLET,CHEWABLE ORAL DAILY
COMMUNITY

## 2019-01-28 RX ORDER — BUSPIRONE HYDROCHLORIDE 5 MG/1
5 TABLET ORAL 2 TIMES DAILY
COMMUNITY
End: 2020-02-17

## 2019-01-28 RX ORDER — AMLODIPINE BESYLATE 5 MG/1
5 TABLET ORAL DAILY
COMMUNITY

## 2019-01-28 RX ORDER — PHENOL 1.4 %
600 AEROSOL, SPRAY (ML) MUCOUS MEMBRANE DAILY
COMMUNITY
End: 2020-02-17

## 2019-01-29 ENCOUNTER — HOSPITAL ENCOUNTER (OUTPATIENT)
Facility: HOSPITAL | Age: 84
Setting detail: HOSPITAL OUTPATIENT SURGERY
Discharge: HOME | End: 2019-01-29
Attending: INTERNAL MEDICINE | Admitting: INTERNAL MEDICINE

## 2019-01-29 ENCOUNTER — ANESTHESIA EVENT (OUTPATIENT)
Dept: GASTROENTEROLOGY | Facility: HOSPITAL | Age: 84
End: 2019-01-29

## 2019-01-29 ENCOUNTER — ANESTHESIA (OUTPATIENT)
Dept: GASTROENTEROLOGY | Facility: HOSPITAL | Age: 84
End: 2019-01-29

## 2019-01-29 VITALS
RESPIRATION RATE: 18 BRPM | SYSTOLIC BLOOD PRESSURE: 146 MMHG | OXYGEN SATURATION: 95 % | HEART RATE: 71 BPM | TEMPERATURE: 97.3 F | BODY MASS INDEX: 30.7 KG/M2 | HEIGHT: 65 IN | WEIGHT: 184.25 LBS | DIASTOLIC BLOOD PRESSURE: 68 MMHG

## 2019-01-29 PROCEDURE — 25010000002 PROPOFOL 10 MG/ML EMULSION: Performed by: NURSE ANESTHETIST, CERTIFIED REGISTERED

## 2019-01-29 RX ORDER — PROMETHAZINE HYDROCHLORIDE 25 MG/ML
12.5 INJECTION, SOLUTION INTRAMUSCULAR; INTRAVENOUS ONCE AS NEEDED
Status: DISCONTINUED | OUTPATIENT
Start: 2019-01-29 | End: 2019-01-29 | Stop reason: SDUPTHER

## 2019-01-29 RX ORDER — PROPOFOL 10 MG/ML
VIAL (ML) INTRAVENOUS AS NEEDED
Status: DISCONTINUED | OUTPATIENT
Start: 2019-01-29 | End: 2019-01-29 | Stop reason: SURG

## 2019-01-29 RX ORDER — ONDANSETRON 2 MG/ML
4 INJECTION INTRAMUSCULAR; INTRAVENOUS ONCE AS NEEDED
Status: DISCONTINUED | OUTPATIENT
Start: 2019-01-29 | End: 2019-01-29 | Stop reason: SDUPTHER

## 2019-01-29 RX ORDER — PROMETHAZINE HYDROCHLORIDE 25 MG/1
25 SUPPOSITORY RECTAL ONCE AS NEEDED
Status: DISCONTINUED | OUTPATIENT
Start: 2019-01-29 | End: 2019-01-29 | Stop reason: HOSPADM

## 2019-01-29 RX ORDER — PROMETHAZINE HYDROCHLORIDE 25 MG/1
25 SUPPOSITORY RECTAL ONCE AS NEEDED
Status: DISCONTINUED | OUTPATIENT
Start: 2019-01-29 | End: 2019-01-29

## 2019-01-29 RX ORDER — PROMETHAZINE HYDROCHLORIDE 25 MG/1
25 TABLET ORAL ONCE AS NEEDED
Status: DISCONTINUED | OUTPATIENT
Start: 2019-01-29 | End: 2019-01-29 | Stop reason: HOSPADM

## 2019-01-29 RX ORDER — ONDANSETRON 2 MG/ML
4 INJECTION INTRAMUSCULAR; INTRAVENOUS ONCE AS NEEDED
Status: DISCONTINUED | OUTPATIENT
Start: 2019-01-29 | End: 2019-01-29 | Stop reason: HOSPADM

## 2019-01-29 RX ORDER — DEXAMETHASONE SODIUM PHOSPHATE 4 MG/ML
8 INJECTION, SOLUTION INTRA-ARTICULAR; INTRALESIONAL; INTRAMUSCULAR; INTRAVENOUS; SOFT TISSUE ONCE AS NEEDED
Status: DISCONTINUED | OUTPATIENT
Start: 2019-01-29 | End: 2019-01-29 | Stop reason: HOSPADM

## 2019-01-29 RX ORDER — PROMETHAZINE HYDROCHLORIDE 25 MG/1
25 TABLET ORAL ONCE AS NEEDED
Status: DISCONTINUED | OUTPATIENT
Start: 2019-01-29 | End: 2019-01-29

## 2019-01-29 RX ORDER — LIDOCAINE HYDROCHLORIDE 10 MG/ML
INJECTION, SOLUTION INFILTRATION; PERINEURAL AS NEEDED
Status: DISCONTINUED | OUTPATIENT
Start: 2019-01-29 | End: 2019-01-29 | Stop reason: SURG

## 2019-01-29 RX ORDER — PROPOFOL 10 MG/ML
VIAL (ML) INTRAVENOUS AS NEEDED
Status: DISCONTINUED | OUTPATIENT
Start: 2019-01-29 | End: 2019-01-29

## 2019-01-29 RX ORDER — PROMETHAZINE HYDROCHLORIDE 25 MG/ML
12.5 INJECTION, SOLUTION INTRAMUSCULAR; INTRAVENOUS ONCE AS NEEDED
Status: DISCONTINUED | OUTPATIENT
Start: 2019-01-29 | End: 2019-01-29 | Stop reason: HOSPADM

## 2019-01-29 RX ORDER — DEXTROSE AND SODIUM CHLORIDE 5; .45 G/100ML; G/100ML
20 INJECTION, SOLUTION INTRAVENOUS CONTINUOUS
Status: DISCONTINUED | OUTPATIENT
Start: 2019-01-29 | End: 2019-01-29 | Stop reason: HOSPADM

## 2019-01-29 RX ORDER — DEXAMETHASONE SODIUM PHOSPHATE 4 MG/ML
8 INJECTION, SOLUTION INTRA-ARTICULAR; INTRALESIONAL; INTRAMUSCULAR; INTRAVENOUS; SOFT TISSUE ONCE AS NEEDED
Status: DISCONTINUED | OUTPATIENT
Start: 2019-01-29 | End: 2019-01-29 | Stop reason: SDUPTHER

## 2019-01-29 RX ADMIN — LIDOCAINE HYDROCHLORIDE 50 MG: 10 INJECTION, SOLUTION INFILTRATION; PERINEURAL at 11:28

## 2019-01-29 RX ADMIN — PROPOFOL 20 MG: 10 INJECTION, EMULSION INTRAVENOUS at 11:30

## 2019-01-29 RX ADMIN — DEXTROSE AND SODIUM CHLORIDE 20 ML/HR: 5; 450 INJECTION, SOLUTION INTRAVENOUS at 10:20

## 2019-01-29 RX ADMIN — PROPOFOL 20 MG: 10 INJECTION, EMULSION INTRAVENOUS at 11:32

## 2019-01-29 RX ADMIN — PROPOFOL 40 MG: 10 INJECTION, EMULSION INTRAVENOUS at 11:28

## 2019-01-29 RX ADMIN — PROPOFOL 220 MG: 10 INJECTION, EMULSION INTRAVENOUS at 11:29

## 2019-01-29 NOTE — ANESTHESIA POSTPROCEDURE EVALUATION
Patient: Karen Conteh    Procedure Summary     Date:  01/29/19 Room / Location:  [facility] ENDOSCOPY 2 / [facility] ENDOSCOPY    Anesthesia Start:  1126 Anesthesia Stop:  1136    Procedure:  ESOPHAGOGASTRODUODENOSCOPY (N/A ) Diagnosis:       Stricture of esophagus      Hiatal hernia      (Stricture of esophagus [K22.2])    Surgeon:  Jude Hewitt DO Provider:  Catherine Mcghee CRNA    Anesthesia Type:  MAC ASA Status:  3          Anesthesia Type: MAC  Last vitals  BP   147/75 (01/29/19 1012)   Temp       Pulse   79 (01/29/19 1012)   Resp   18 (01/29/19 1012)     SpO2   92 % (01/29/19 1012)     Post Anesthesia Care and Evaluation    Patient location during evaluation: bedside  Patient participation: complete - patient participated  Level of consciousness: sleepy but conscious  Pain score: 0  Pain management: adequate  Airway patency: patent  Anesthetic complications: No anesthetic complications  PONV Status: none  Cardiovascular status: acceptable  Respiratory status: acceptable  Hydration status: acceptable

## 2019-01-29 NOTE — ANESTHESIA PREPROCEDURE EVALUATION
Anesthesia Evaluation     Patient summary reviewed and Nursing notes reviewed   NPO Solid Status: > 8 hours  NPO Liquid Status: > 2 hours           Airway   Mallampati: II  TM distance: >3 FB  Neck ROM: full  No difficulty expected  Dental    (+) upper dentures    Pulmonary - normal exam   Cardiovascular - normal exam    PT is on anticoagulation therapy    (+) hypertension well controlled less than 2 medications, PVD,       Neuro/Psych  (+) CVA,     GI/Hepatic/Renal/Endo    (+)  GERD,      Musculoskeletal     Abdominal  - normal exam   Substance History      OB/GYN          Other          Other Comment: Glaucoma  Macular degeneration  ROS/Med Hx Other: Glaucoma Macular deneration                  Anesthesia Plan    ASA 3     MAC     intravenous induction   Anesthetic plan, all risks, benefits, and alternatives have been provided, discussed and informed consent has been obtained with: patient.

## 2019-03-20 RX ORDER — LOSARTAN POTASSIUM 100 MG/1
100 TABLET ORAL DAILY
Qty: 90 TABLET | Refills: 2 | Status: SHIPPED | OUTPATIENT
Start: 2019-03-20

## 2019-04-22 DIAGNOSIS — I10 ESSENTIAL HYPERTENSION: Primary | ICD-10-CM

## 2019-04-23 ENCOUNTER — OFFICE VISIT (OUTPATIENT)
Dept: CARDIOLOGY | Facility: CLINIC | Age: 84
End: 2019-04-23

## 2019-04-23 VITALS
HEIGHT: 65 IN | WEIGHT: 185 LBS | BODY MASS INDEX: 30.82 KG/M2 | HEART RATE: 65 BPM | SYSTOLIC BLOOD PRESSURE: 100 MMHG | DIASTOLIC BLOOD PRESSURE: 60 MMHG

## 2019-04-23 DIAGNOSIS — I10 ESSENTIAL HYPERTENSION: Primary | ICD-10-CM

## 2019-04-23 DIAGNOSIS — G45.9 TIA (TRANSIENT ISCHEMIC ATTACK): ICD-10-CM

## 2019-04-23 DIAGNOSIS — E78.00 PURE HYPERCHOLESTEROLEMIA: ICD-10-CM

## 2019-04-23 PROCEDURE — 99214 OFFICE O/P EST MOD 30 MIN: CPT | Performed by: INTERNAL MEDICINE

## 2019-04-23 PROCEDURE — 93000 ELECTROCARDIOGRAM COMPLETE: CPT | Performed by: INTERNAL MEDICINE

## 2019-04-23 RX ORDER — LATANOPROST 50 UG/ML
SOLUTION/ DROPS OPHTHALMIC
COMMUNITY
Start: 2018-09-14 | End: 2019-04-23 | Stop reason: SDUPTHER

## 2019-04-23 RX ORDER — LEVOTHYROXINE SODIUM 0.03 MG/1
TABLET ORAL
COMMUNITY
Start: 2019-02-14 | End: 2019-04-23 | Stop reason: SDUPTHER

## 2019-04-23 RX ORDER — CLOPIDOGREL BISULFATE 75 MG/1
TABLET ORAL
COMMUNITY
Start: 2018-12-28 | End: 2019-04-23 | Stop reason: SDUPTHER

## 2019-04-23 RX ORDER — DEXLANSOPRAZOLE 60 MG/1
CAPSULE, DELAYED RELEASE ORAL
COMMUNITY
Start: 2019-04-19 | End: 2021-06-16

## 2019-04-23 RX ORDER — AMLODIPINE BESYLATE 5 MG/1
1 TABLET ORAL
COMMUNITY
Start: 2018-12-21 | End: 2019-04-23 | Stop reason: SDUPTHER

## 2019-04-23 NOTE — PROGRESS NOTES
[facility] Cardiology  OFFICE NOTE    Karen Conteh  89 y.o. female    04/23/2019  1. Essential hypertension    2. Pure hypercholesterolemia    3. TIA (transient ischemic attack)        Chief complaint -follow up hypertension/history of TIAs    History of present Illness- 89-year-old lady who is otherwise healthy has history of TIAs in the past and his history of hypertension and hyperlipidemia.  She is on aspirin and Plavix because of multiple recurrent TIAs and she was tried on Xarelto and could not tolerate it due to bleeding.  She is currently doing okay she has hearing impairment and she has generalized aches and pains possibly related to arthritis plus fibromyalgia.  She gets around pretty okay with a walker as per the daughter and her memory is okay.              Allergies   Allergen Reactions   • Spironolactone Other (See Comments)     Daughter doesn't remember   • Tetracyclines & Related      SWEATS   • Xarelto [Rivaroxaban] Other (See Comments)     hematoma         Past Medical History:   Diagnosis Date   • Achilles tendinitis    • Acute atopic conjunctivitis    • Artificial lens in position    • Calcaneal spur    • Equinus contracture of the ankle    • Essential hypertension    • Fibrocystic disease of breast    • Gastroesophageal reflux disease    • Glaucoma    • Hypernatremia    • Keratoconjunctivitis sicca (CMS/HCC)    • Myopia    • needs influenza immunization    • Nonexudative age-related macular degeneration    • Nuclear cataract    • Osteoporosis    • Plantar fasciitis    • Primary open angle glaucoma    • Pseudophakia    • Stroke (CMS/HCC)     ministrokes   • 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 left eye   • ENDOSCOPY      Dysphagia, non-obstructive. Gastroesophageal reflux without esophagitis. Erythema of the arytenoid folds.  Chronic gastritis without bleeding   • ENDOSCOPY N/A 1/30/2018    Procedure: ESOPHAGOGASTRODUODENOSCOPY;  Surgeon: Jude Hewitt DO;  Location: [facility] ENDOSCOPY;  Service:    • ENDOSCOPY N/A 1/29/2019    Procedure: ESOPHAGOGASTRODUODENOSCOPY;  Surgeon: Jude Hewitt DO;  Location: [facility] ENDOSCOPY;  Service: Gastroenterology   • REPLACEMENT TOTAL KNEE     • TOTAL ABDOMINAL HYSTERECTOMY WITH SALPINGO OOPHORECTOMY      Dilatation and curettage; total abdominal hysterectomy; bilateral salpingo-oophorectomy and appendectomy         Family History   Problem Relation Age of Onset   • Cancer Other    • Diabetes Other    • Heart disease Other    • Hypertension Other          Social History     Socioeconomic History   • Marital status:      Spouse name: Not on file   • Number of children: Not on file   • Years of education: Not on file   • Highest education level: Not on file   Tobacco Use   • Smoking status: Never Smoker   • Smokeless tobacco: Never Used   Substance and Sexual Activity   • Alcohol use: No   • Drug use: No   • Sexual activity: Defer         Current Outpatient Medications   Medication Sig Dispense Refill   • alendronate (FOSAMAX) 35 MG tablet Take 35 mg by mouth Every 7 (Seven) Days.     • amLODIPine (NORVASC) 5 MG tablet Take 5 mg by mouth Daily.     • aspirin 81 MG chewable tablet Chew 81 mg Daily.     • busPIRone (BUSPAR) 5 MG tablet Take 5 mg by mouth 2 (Two) Times a Day.     • calcium carbonate (OS-JOHAN) 600 MG tablet Take 600 mg by mouth Daily.     • clopidogrel (PLAVIX) 75 MG tablet Take 75 mg by mouth Daily.     • Cyanocobalamin (B-12) 1000 MCG capsule Take 1 capsule by mouth Daily.     • DEXILANT 60 MG capsule      • diphenhydrAMINE (BENADRYL) 25 mg capsule Take 25 mg by mouth Every 6 (Six) Hours As Needed for Itching.     • escitalopram (LEXAPRO) 10 MG tablet Take 10 mg by mouth every night.     • fluticasone (FLONASE) 50 MCG/ACT nasal spray 1 spray into the nostril(s) as directed by  "provider Daily. Administer 2 sprays in each nostril for each dose.      • latanoprost (XALATAN) 0.005 % ophthalmic solution Administer 1 drop to both eyes Every Night. 2.5 mL 11   • levothyroxine (SYNTHROID, LEVOTHROID) 25 MCG tablet Take 25 mcg by mouth daily.     • losartan (COZAAR) 100 MG tablet Take 1 tablet by mouth Daily. 90 tablet 2   • Multiple Vitamins-Minerals (MULTIVITAMIN ADULT PO) Take 1 tablet by mouth Daily.     • pravastatin (PRAVACHOL) 20 MG tablet Take 20 mg by mouth every night.     • timolol (TIMOPTIC) 0.5 % ophthalmic solution Administer 1 drop into the left eye Daily.       No current facility-administered medications for this visit.          Review of Systems     Constitution: Denies any fatigue, fever or chills    HENT: impairment has hearing aids    Eyes: Denies any blurring of vision, or photophobia     Cardivascular - As per history of present illness     Respiratory system- was of breath NYHA class II     Endocrine:   history of hyperlipidemia,r Hypothyroidism                       Musculoskeletal: Arthritis of the knee, ankle and shoulders    Gastrointestinal: History of GERD    Genitourinary: No dysuria or hematuria    Neurological:    TIAs in the past    Psychiatric/Behavioral:         history of depression    Hematological- bleeding with Xarelto            OBJECTIVE    /60   Pulse 65   Ht 165.1 cm (65\")   Wt 83.9 kg (185 lb)   BMI 30.79 kg/m²       Physical Exam     Constitutional: is oriented to person, place, and time.     Skin-warm and dry    Well developed and nourished in no acute distress      Head: Normocephalic and atraumatic.     Eyes: Pupils are equal    Neck: Neck supple. No bruit in the carotids    Cardiovascular: Nemo in the fifth intercostal space   Regular rate, and  Rhythm,    S1 greater than S2,    Pulmonary/Chest:   Air  Entry is equal on both sides  No wheezing or crackles,      Abdominal: Soft.  No hepatosplenomegaly    Musculoskeletal:   Arthritis of the " joints    Neurological: is alert and oriented to person, place, and time.    cranial nerve are intact .   No motor or sensory deficit    Extremities-no edema, no radial femoral delay            Procedures        A/P    History of TIAs on aspirin and Plavix doing well, no more TIAs and she gets around with a walker pretty okay.    Hypertension controlled with amlodipine 5 mg daily    Hyperlipidemia on pravastatin doing okay.    Significant GERD on proton pump inhibitors and ranitidine    Follow-up in 7 months            This document has been electronically signed by Addy Lucas MD on April 23, 2019 1:18 PM       EMR Dragon/Transcription disclaimer:   Some of this note may be an electronic transcription/translation of spoken language to printed text. The electronic translation of spoken language may permit erroneous, or at times, nonsensical words or phrases to be inadvertently transcribed; Although I have reviewed the note for such errors, some may still exist.

## 2019-06-20 ENCOUNTER — OFFICE VISIT (OUTPATIENT)
Dept: OTOLARYNGOLOGY | Facility: CLINIC | Age: 84
End: 2019-06-20

## 2019-06-20 VITALS
HEART RATE: 60 BPM | BODY MASS INDEX: 30.96 KG/M2 | TEMPERATURE: 97.8 F | HEIGHT: 65 IN | OXYGEN SATURATION: 98 % | WEIGHT: 185.8 LBS

## 2019-06-20 DIAGNOSIS — H61.21 IMPACTED CERUMEN OF RIGHT EAR: Primary | ICD-10-CM

## 2019-06-20 DIAGNOSIS — H60.8X1 CHRONIC ECZEMATOUS OTITIS EXTERNA OF RIGHT EAR: ICD-10-CM

## 2019-06-20 DIAGNOSIS — H90.3 SENSORINEURAL HEARING LOSS OF BOTH EARS: ICD-10-CM

## 2019-06-20 PROCEDURE — 69210 REMOVE IMPACTED EAR WAX UNI: CPT | Performed by: OTOLARYNGOLOGY

## 2019-06-20 PROCEDURE — 99212 OFFICE O/P EST SF 10 MIN: CPT | Performed by: OTOLARYNGOLOGY

## 2019-06-20 RX ORDER — OFLOXACIN 3 MG/ML
4 SOLUTION AURICULAR (OTIC) 2 TIMES DAILY
Qty: 10 ML | Refills: 0 | Status: SHIPPED | OUTPATIENT
Start: 2019-06-20 | End: 2020-02-17

## 2019-06-20 RX ORDER — MELOXICAM 7.5 MG/1
7.5 TABLET ORAL DAILY
COMMUNITY
End: 2021-06-16

## 2019-06-20 NOTE — PROGRESS NOTES
Subjective   Karen Conteh is a 89 y.o. female.   Follow-up ears    History of Present Illness     No new ear complaints except hearing loss and itching right ear    The following portions of the patient's history were reviewed and updated as appropriate: allergies, current medications, past family history, past medical history, past social history, past surgical history and problem list.      Current Outpatient Medications:   •  alendronate (FOSAMAX) 35 MG tablet, Take 35 mg by mouth Every 7 (Seven) Days., Disp: , Rfl:   •  amLODIPine (NORVASC) 5 MG tablet, Take 5 mg by mouth Daily., Disp: , Rfl:   •  aspirin 81 MG chewable tablet, Chew 81 mg Daily., Disp: , Rfl:   •  busPIRone (BUSPAR) 5 MG tablet, Take 5 mg by mouth 2 (Two) Times a Day., Disp: , Rfl:   •  calcium carbonate (OS-JOHAN) 600 MG tablet, Take 600 mg by mouth Daily., Disp: , Rfl:   •  clopidogrel (PLAVIX) 75 MG tablet, Take 75 mg by mouth Daily., Disp: , Rfl:   •  Cyanocobalamin (B-12) 1000 MCG capsule, Take 1 capsule by mouth Daily., Disp: , Rfl:   •  DEXILANT 60 MG capsule, , Disp: , Rfl:   •  diphenhydrAMINE (BENADRYL) 25 mg capsule, Take 25 mg by mouth Every 6 (Six) Hours As Needed for Itching., Disp: , Rfl:   •  escitalopram (LEXAPRO) 10 MG tablet, Take 10 mg by mouth every night., Disp: , Rfl:   •  fluticasone (FLONASE) 50 MCG/ACT nasal spray, 1 spray into the nostril(s) as directed by provider Daily. Administer 2 sprays in each nostril for each dose. , Disp: , Rfl:   •  latanoprost (XALATAN) 0.005 % ophthalmic solution, Administer 1 drop to both eyes Every Night., Disp: 2.5 mL, Rfl: 11  •  levothyroxine (SYNTHROID, LEVOTHROID) 25 MCG tablet, Take 25 mcg by mouth daily., Disp: , Rfl:   •  losartan (COZAAR) 100 MG tablet, Take 1 tablet by mouth Daily., Disp: 90 tablet, Rfl: 2  •  meloxicam (MOBIC) 7.5 MG tablet, Take 7.5 mg by mouth Daily., Disp: , Rfl:   •  Multiple Vitamins-Minerals (MULTIVITAMIN ADULT PO), Take 1 tablet by mouth Daily., Disp: ,  Rfl:   •  pravastatin (PRAVACHOL) 20 MG tablet, Take 20 mg by mouth every night., Disp: , Rfl:   •  timolol (TIMOPTIC) 0.5 % ophthalmic solution, Administer 1 drop into the left eye Daily., Disp: , Rfl:   •  ofloxacin (FLOXIN) 0.3 % otic solution, Administer 4 drops into ear(s) as directed by provider 2 (Two) Times a Day. Use in affected ear for 3 days, Disp: 10 mL, Rfl: 0    Allergies   Allergen Reactions   • Spironolactone Other (See Comments)     Daughter doesn't remember   • Tetracyclines & Related      SWEATS   • Xarelto [Rivaroxaban] Other (See Comments)     hematoma             Review of Systems        Objective   Physical Exam   Constitutional: She appears well-developed and well-nourished.   HENT:   Head: Normocephalic and atraumatic.   Right Ear: Tympanic membrane and external ear normal.   Left Ear: Tympanic membrane and external ear normal.   Ears:    Nose: Nose normal. No mucosal edema, rhinorrhea, nasal deformity or septal deviation. No epistaxis. Right sinus exhibits no maxillary sinus tenderness and no frontal sinus tenderness. Left sinus exhibits no maxillary sinus tenderness and no frontal sinus tenderness.   Mouth/Throat: Uvula is midline, oropharynx is clear and moist and mucous membranes are normal. No trismus in the jaw. Normal dentition. No oropharyngeal exudate or posterior oropharyngeal edema. Tonsils are 1+ on the right. Tonsils are 1+ on the left. No tonsillar exudate.   Eyes: Conjunctivae are normal.   Neck: No JVD present. No tracheal deviation present. No thyromegaly present.   Pulmonary/Chest: Effort normal.   Musculoskeletal: Normal range of motion.   Lymphadenopathy:        Head (right side): No submental, no submandibular, no tonsillar, no preauricular, no posterior auricular and no occipital adenopathy present.        Head (left side): No submental, no submandibular, no tonsillar, no preauricular, no posterior auricular and no occipital adenopathy present.     She has no cervical  adenopathy.        Right cervical: No superficial cervical, no deep cervical and no posterior cervical adenopathy present.       Left cervical: No superficial cervical, no deep cervical and no posterior cervical adenopathy present.   Neurological: She is alert. No cranial nerve deficit.   Skin: Skin is warm.   Psychiatric: She has a normal mood and affect. Her speech is normal and behavior is normal. Thought content normal.   Nursing note and vitals reviewed.      Procedure note B/l cerumen removal with microscope and forceps done with complication    Assessment/Plan   Karen was seen today for follow-up.    Diagnoses and all orders for this visit:    Impacted cerumen of right ear    Sensorineural hearing loss of both ears    Chronic eczematous otitis externa of right ear    Other orders  -     ofloxacin (FLOXIN) 0.3 % otic solution; Administer 4 drops into ear(s) as directed by provider 2 (Two) Times a Day. Use in affected ear for 3 days     Use drops as needed itching use peroxide weekly keep wax buildup follow-up 6 months call if problems or questions in the interim

## 2019-06-20 NOTE — PATIENT INSTRUCTIONS

## 2019-12-19 ENCOUNTER — OFFICE VISIT (OUTPATIENT)
Dept: OTOLARYNGOLOGY | Facility: CLINIC | Age: 84
End: 2019-12-19

## 2019-12-19 VITALS — BODY MASS INDEX: 32.06 KG/M2 | TEMPERATURE: 96.9 F | HEIGHT: 65 IN | WEIGHT: 192.4 LBS

## 2019-12-19 DIAGNOSIS — H90.3 SENSORINEURAL HEARING LOSS OF BOTH EARS: ICD-10-CM

## 2019-12-19 DIAGNOSIS — H61.21 IMPACTED CERUMEN OF RIGHT EAR: Primary | ICD-10-CM

## 2019-12-19 PROCEDURE — 99213 OFFICE O/P EST LOW 20 MIN: CPT | Performed by: OTOLARYNGOLOGY

## 2019-12-19 PROCEDURE — 69210 REMOVE IMPACTED EAR WAX UNI: CPT | Performed by: OTOLARYNGOLOGY

## 2019-12-19 NOTE — PATIENT INSTRUCTIONS

## 2019-12-19 NOTE — PROGRESS NOTES
Subjective   Karen Conteh is a 89 y.o. female.     Follow-up ear problems  History of Present Illness     Patient comes in with no new complaints no unusual drainage pain discomfort she has longstanding hearing loss uses a hearing aid in her left ear but not the right she has trouble with memory and her family gives some of the history    The following portions of the patient's history were reviewed and updated as appropriate: allergies, current medications, past family history, past medical history, past social history, past surgical history and problem list.      Current Outpatient Medications:   •  amLODIPine (NORVASC) 5 MG tablet, Take 5 mg by mouth Daily., Disp: , Rfl:   •  aspirin 81 MG chewable tablet, Chew 81 mg Daily., Disp: , Rfl:   •  busPIRone (BUSPAR) 5 MG tablet, Take 5 mg by mouth 2 (Two) Times a Day., Disp: , Rfl:   •  clopidogrel (PLAVIX) 75 MG tablet, Take 75 mg by mouth Daily., Disp: , Rfl:   •  Cyanocobalamin (B-12) 1000 MCG capsule, Take 1 capsule by mouth Daily., Disp: , Rfl:   •  escitalopram (LEXAPRO) 10 MG tablet, Take 10 mg by mouth every night., Disp: , Rfl:   •  latanoprost (XALATAN) 0.005 % ophthalmic solution, Administer 1 drop to both eyes Every Night., Disp: 2.5 mL, Rfl: 11  •  levothyroxine (SYNTHROID, LEVOTHROID) 25 MCG tablet, Take 25 mcg by mouth daily., Disp: , Rfl:   •  losartan (COZAAR) 100 MG tablet, Take 1 tablet by mouth Daily., Disp: 90 tablet, Rfl: 2  •  meloxicam (MOBIC) 7.5 MG tablet, Take 7.5 mg by mouth Daily., Disp: , Rfl:   •  Multiple Vitamins-Minerals (MULTIVITAMIN ADULT PO), Take 1 tablet by mouth Daily., Disp: , Rfl:   •  pravastatin (PRAVACHOL) 20 MG tablet, Take 20 mg by mouth every night., Disp: , Rfl:   •  timolol (TIMOPTIC) 0.5 % ophthalmic solution, Administer 1 drop into the left eye Daily., Disp: , Rfl:   •  alendronate (FOSAMAX) 35 MG tablet, Take 35 mg by mouth Every 7 (Seven) Days., Disp: , Rfl:   •  calcium carbonate (OS-JOHAN) 600 MG tablet, Take  600 mg by mouth Daily., Disp: , Rfl:   •  DEXILANT 60 MG capsule, , Disp: , Rfl:   •  diphenhydrAMINE (BENADRYL) 25 mg capsule, Take 25 mg by mouth Every 6 (Six) Hours As Needed for Itching., Disp: , Rfl:   •  fluticasone (FLONASE) 50 MCG/ACT nasal spray, 1 spray into the nostril(s) as directed by provider Daily. Administer 2 sprays in each nostril for each dose. , Disp: , Rfl:   •  ofloxacin (FLOXIN) 0.3 % otic solution, Administer 4 drops into ear(s) as directed by provider 2 (Two) Times a Day. Use in affected ear for 3 days, Disp: 10 mL, Rfl: 0    Allergies   Allergen Reactions   • Spironolactone Other (See Comments)     Daughter doesn't remember   • Tetracyclines & Related      SWEATS   • Xarelto [Rivaroxaban] Other (See Comments)     hematoma             Review of Systems   Constitutional: Negative for fever.   HENT: Positive for hearing loss. Negative for ear discharge and ear pain.    Hematological: Negative for adenopathy.           Objective   Physical Exam   Constitutional: She appears well-developed and well-nourished.   HENT:   Head: Normocephalic.   Right Ear: External ear normal.   Left Ear: External ear normal.   Ears:    Eyes: Conjunctivae are normal.   Neck: Neck supple.   Pulmonary/Chest: Effort normal.   Vitals reviewed.      Procedure note right ear was cleaned of large amount of cerumen there is small amount in the left was cleaned of the microscope suction forceps she tolerated well without evidence complication        Assessment/Plan   Karen was seen today for follow-up.    Diagnoses and all orders for this visit:    Impacted cerumen of right ear    Sensorineural hearing loss of both ears      Discussed ear care and use the drops as well as peroxide    Continue using hearing aid    Check with audiologist regarding adjustments of hearing aid  Call if question problems otherwise follow-up in 3 to 4 months

## 2020-02-17 ENCOUNTER — OFFICE VISIT (OUTPATIENT)
Dept: CARDIOLOGY | Facility: CLINIC | Age: 85
End: 2020-02-17

## 2020-02-17 VITALS
OXYGEN SATURATION: 99 % | HEIGHT: 65 IN | DIASTOLIC BLOOD PRESSURE: 78 MMHG | WEIGHT: 190.6 LBS | SYSTOLIC BLOOD PRESSURE: 120 MMHG | BODY MASS INDEX: 31.75 KG/M2 | HEART RATE: 57 BPM

## 2020-02-17 DIAGNOSIS — G45.9 TIA (TRANSIENT ISCHEMIC ATTACK): Primary | ICD-10-CM

## 2020-02-17 DIAGNOSIS — G45.9 TIA (TRANSIENT ISCHEMIC ATTACK): ICD-10-CM

## 2020-02-17 DIAGNOSIS — I10 ESSENTIAL HYPERTENSION: Primary | ICD-10-CM

## 2020-02-17 DIAGNOSIS — E78.00 PURE HYPERCHOLESTEROLEMIA: ICD-10-CM

## 2020-02-17 PROCEDURE — 93000 ELECTROCARDIOGRAM COMPLETE: CPT | Performed by: INTERNAL MEDICINE

## 2020-02-17 PROCEDURE — 99213 OFFICE O/P EST LOW 20 MIN: CPT | Performed by: INTERNAL MEDICINE

## 2020-02-17 RX ORDER — PYRAZINAMIDE 500 MG/1
1 TABLET ORAL EVERY 6 HOURS PRN
COMMUNITY
Start: 2020-02-11 | End: 2021-04-02

## 2020-02-17 NOTE — PROGRESS NOTES
Karen Conteh  89 y.o. female    02/17/2020  Chief Complaint   Patient presents with   • Hypertension       History of Present Illness    Patient is here doing well.  She has a history of TIAs.  She cannot tolerate no actual warfarin.  Is currently on Plavix and aspirin is doing well.  She has no other cardiac complaints.  She broke her left arm with a hairline fracture.  It is in a cast.  -        SUBJECTIVE    Allergies   Allergen Reactions   • Spironolactone Other (See Comments)     Daughter doesn't remember   • Tetracyclines & Related      SWEATS   • Xarelto [Rivaroxaban] Other (See Comments)     hematoma         Past Medical History:   Diagnosis Date   • Achilles tendinitis    • Acute atopic conjunctivitis    • Artificial lens in position    • Calcaneal spur    • Equinus contracture of the ankle    • Essential hypertension    • Fibrocystic disease of breast    • Gastroesophageal reflux disease    • Glaucoma    • Hypernatremia    • Keratoconjunctivitis sicca (CMS/HCC)    • Myopia    • needs influenza immunization    • Nonexudative age-related macular degeneration    • Nuclear cataract    • Osteoporosis    • Plantar fasciitis    • Primary open angle glaucoma    • Pseudophakia    • Stroke (CMS/HCC)     ministrokes   • 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 left eye   • ENDOSCOPY      Dysphagia, non-obstructive. Gastroesophageal reflux without esophagitis. Erythema of the arytenoid folds. Chronic gastritis without bleeding   • ENDOSCOPY N/A 1/30/2018    Procedure: ESOPHAGOGASTRODUODENOSCOPY;  Surgeon: Jude Hewitt DO;  Location: [facility] ENDOSCOPY;  Service:    • ENDOSCOPY N/A 1/29/2019    Procedure: ESOPHAGOGASTRODUODENOSCOPY;  Surgeon: Jude Hewitt DO;  Location: [facility] ENDOSCOPY;  Service: Gastroenterology   • REPLACEMENT TOTAL KNEE     • TOTAL ABDOMINAL  HYSTERECTOMY WITH SALPINGO OOPHORECTOMY      Dilatation and curettage; total abdominal hysterectomy; bilateral salpingo-oophorectomy and appendectomy         Family History   Problem Relation Age of Onset   • Cancer Other    • Diabetes Other    • Heart disease Other    • Hypertension Other          Social History     Socioeconomic History   • Marital status:      Spouse name: Not on file   • Number of children: Not on file   • Years of education: Not on file   • Highest education level: Not on file   Tobacco Use   • Smoking status: Never Smoker   • Smokeless tobacco: Never Used   Substance and Sexual Activity   • Alcohol use: No   • Drug use: No   • Sexual activity: Defer         Prior to Admission medications    Medication Sig Start Date End Date Taking? Authorizing Provider   acetaminophen-codeine (TYLENOL with CODEINE #3) 300-30 MG per tablet Take 1 tablet by mouth Every 6 (Six) Hours As Needed. 2/11/20  Yes Paco Rai MD   amLODIPine (NORVASC) 5 MG tablet Take 5 mg by mouth Daily.   Yes Paco Rai MD   aspirin 81 MG chewable tablet Chew 81 mg Daily.   Yes Paco Rai MD   clopidogrel (PLAVIX) 75 MG tablet Take 75 mg by mouth Daily.   Yes Paco Rai MD   Cyanocobalamin (B-12) 1000 MCG capsule Take 1 capsule by mouth Daily.   Yes Paco Rai MD   DEXILANT 60 MG capsule  4/19/19  Yes Paco Rai MD   escitalopram (LEXAPRO) 10 MG tablet Take 10 mg by mouth every night.   Yes Paco Rai MD   latanoprost (XALATAN) 0.005 % ophthalmic solution Administer 1 drop to both eyes Every Night. 1/30/17  Yes Alek Hernandez MD   levothyroxine (SYNTHROID, LEVOTHROID) 25 MCG tablet Take 25 mcg by mouth daily.   Yes Paco Rai MD   losartan (COZAAR) 100 MG tablet Take 1 tablet by mouth Daily. 3/20/19  Yes Addy Lucas MD   meloxicam (MOBIC) 7.5 MG tablet Take 7.5 mg by mouth Daily.   Yes Paco Rai MD   Multiple  "Vitamins-Minerals (MULTIVITAMIN ADULT PO) Take 1 tablet by mouth Daily.   Yes Paco Rai MD   pravastatin (PRAVACHOL) 20 MG tablet Take 20 mg by mouth every night.   Yes Paco Rai MD   timolol (TIMOPTIC) 0.5 % ophthalmic solution Administer 1 drop into the left eye Daily.   Yes Paco Rai MD   alendronate (FOSAMAX) 35 MG tablet Take 35 mg by mouth Every 7 (Seven) Days.  2/17/20  Paco Rai MD   busPIRone (BUSPAR) 5 MG tablet Take 5 mg by mouth 2 (Two) Times a Day.  2/17/20  Paco Rai MD   calcium carbonate (OS-JOHAN) 600 MG tablet Take 600 mg by mouth Daily.  2/17/20  Paco Rai MD   diphenhydrAMINE (BENADRYL) 25 mg capsule Take 25 mg by mouth Every 6 (Six) Hours As Needed for Itching.  2/17/20  Paco Rai MD   fluticasone (FLONASE) 50 MCG/ACT nasal spray 1 spray into the nostril(s) as directed by provider Daily. Administer 2 sprays in each nostril for each dose.   2/17/20  Paco Rai MD   ofloxacin (FLOXIN) 0.3 % otic solution Administer 4 drops into ear(s) as directed by provider 2 (Two) Times a Day. Use in affected ear for 3 days 6/20/19 2/17/20  Brent Diaz MD         OBJECTIVE    /78 (BP Location: Right arm, Patient Position: Sitting)   Pulse 57   Ht 165.1 cm (65\")   Wt 86.5 kg (190 lb 9.6 oz)   SpO2 99%   BMI 31.72 kg/m²         Review of Systems     Constitutional:  Denies recent weight loss, weight gain, fever or chills, no change in exercise tolerance     HENT:  Denies any hearing loss, epistaxis, hoarseness, or difficulty speaking.     Eyes: Wears eyeglasses or contact lenses     Respiratory:  Denies dyspnea with exertion,no cough, wheezing, or hemoptysis.     Cardiovascular: Negative for palpations, chest pain, orthopnea, PND, peripheral edema, syncope, or claudication.     Gastrointestinal:  Denies change in bowel habits, dyspepsia, ulcer disease, hematochezia, or melena.     Endocrine: Negative for " cold intolerance, heat intolerance, polydipsia, polyphagia and polyuria. Denies any history of weight change, heat/cold intolerance, polydipsia, polyuria     Genitourinary: Negative.      Musculoskeletal: Denies any history of arthritic symptoms or back problems     Skin:  Denies any change in hair or nails, rashes, or skin lesions.     Allergic/Immunologic: Negative.  Negative for environmental allergies, food allergies and immunocompromised state.     Neurological:  Denies any history of recurrent headaches, strokes, TIA, or seizure disorder.     Hematological: Denies any food allergies, seasonal allergies, bleeding disorders, or lymphadenopathy.     Psychiatric/Behavioral: Denies any history of depression, substance abuse, or change in cognitive function.         Physical Exam     Constitutional: Cooperative, alert and oriented, well-developed, well-nourished, in no acute distress.     HENT:   Head: Normocephalic, normal hair patterns, no masses or tenderness.  Ears, Nose, and Throat: No gross abnormalities. No pallor or cyanosis. Dentition good.   Eyes: EOMS intact, PERRL, conjunctivae and lids unremarkable. Fundoscopic exam and visual fields not performed.   Neck: No palpable masses or adenopathy, no thyromegaly, no JVD, carotid pulses are full and equal bilaterally and without  Bruits.     Cardiovascular: Regular rhythm, S1 and S2 normal, no S3 or S4. Apical impulse not displaced. No murmurs, gallops, or rubs detected.     Pulmonary/Chest: Chest: normal symmetry, no tenderness to palpation, normal respiratory excursion, no intercostal retraction, no use of accessory muscles.            Pulmonary: Normal breath sounds. No rales or ronchi.    Abdominal: Abdomen soft, bowel sounds normoactive, no masses, no hepatosplenomegaly, non-tender, no bruits.     Musculoskeletal: No deformities, clubbing, cyanosis, erythema, or edema observed. There are no spinal abnormalities noted. Normal muscle strength and tone. Pulses  full and equal in all extremities, no bruits auscultated.     Neurological: No gross motor or sensory deficits noted, affect appropriate, oriented to time, person, place.     Skin: Warm and dry to the touch, no apparent skin lesions or masses noted.     Psychiatric: She has a normal mood and affect. Her behavior is normal. Judgment and thought content normal.         Procedures      Lab Results   Component Value Date    WBC 6.7 04/23/2019    HGB 13.9 04/23/2019    HCT 40.7 04/23/2019    MCV 96 (H) 04/23/2019     04/23/2019     Lab Results   Component Value Date    BUN 21 (H) 04/23/2019    CREATININE 1 04/23/2019    CALCIUM 9.6 04/23/2019    ALBUMIN 3.7 11/26/2018    AST 27 11/26/2018    ALT 27 (L) 11/26/2018     No results found for: CHOL  No results found for: TRIG  No results found for: HDL  No components found for: LDLCALC  No results found for: LDL  No results found for: HDLLDLRATIO  No components found for: CHOLHDL  No results found for: HGBA1C  Lab Results   Component Value Date    TSH 2.22 05/24/2018           ASSESSMENT AND PLAN      Karen was seen today for hypertension.    Diagnoses and all orders for this visit:    Essential hypertension  This is stable.  TIA (transient ischemic attack)  No further TIAs.  Pure hypercholesterolemia  Is stable.    Patient and daughter would like just to see us as needed.  They see Dr. Hodge who is handling all her issues.  Film I see no problem with that and Dr. Hodge needs is to do anything he can contact us.  They can call if there is any problems also.      Patient's Body mass index is 31.72 kg/m². BMI is above normal parameters. Recommendations include: none (medical contraindication).                Hal Mendoza MD  2/17/2020  11:30 AM

## 2020-12-17 ENCOUNTER — OFFICE VISIT (OUTPATIENT)
Dept: OTOLARYNGOLOGY | Facility: CLINIC | Age: 85
End: 2020-12-17

## 2020-12-17 ENCOUNTER — CLINICAL SUPPORT (OUTPATIENT)
Dept: AUDIOLOGY | Facility: CLINIC | Age: 85
End: 2020-12-17

## 2020-12-17 VITALS — BODY MASS INDEX: 31.36 KG/M2 | WEIGHT: 188.2 LBS | TEMPERATURE: 97.4 F | HEIGHT: 65 IN

## 2020-12-17 DIAGNOSIS — H61.23 BILATERAL IMPACTED CERUMEN: ICD-10-CM

## 2020-12-17 DIAGNOSIS — H90.3 SENSORINEURAL HEARING LOSS OF BOTH EARS: Primary | ICD-10-CM

## 2020-12-17 DIAGNOSIS — H61.21 IMPACTED CERUMEN OF RIGHT EAR: ICD-10-CM

## 2020-12-17 DIAGNOSIS — H90.3 SENSORINEURAL HEARING LOSS, BILATERAL: Primary | ICD-10-CM

## 2020-12-17 PROCEDURE — 92567 TYMPANOMETRY: CPT | Performed by: AUDIOLOGIST

## 2020-12-17 PROCEDURE — 99213 OFFICE O/P EST LOW 20 MIN: CPT | Performed by: OTOLARYNGOLOGY

## 2020-12-17 PROCEDURE — 92557 COMPREHENSIVE HEARING TEST: CPT | Performed by: AUDIOLOGIST

## 2020-12-17 PROCEDURE — 69210 REMOVE IMPACTED EAR WAX UNI: CPT | Performed by: OTOLARYNGOLOGY

## 2020-12-17 RX ORDER — OLOPATADINE HYDROCHLORIDE 2 MG/ML
SOLUTION/ DROPS OPHTHALMIC
COMMUNITY

## 2020-12-17 RX ORDER — LEVOTHYROXINE SODIUM 0.07 MG/1
TABLET ORAL
COMMUNITY
Start: 2020-11-13 | End: 2021-04-02

## 2020-12-17 RX ORDER — FAMOTIDINE 40 MG/1
TABLET, FILM COATED ORAL
COMMUNITY
Start: 2020-10-07

## 2020-12-17 NOTE — PATIENT INSTRUCTIONS
MyPlate from USDA    MyPlate is an outline of a general healthy diet based on the 2010 Dietary Guidelines for Americans, from the U.S. Department of Agriculture (USDA). It sets guidelines for how much food you should eat from each food group based on your age, sex, and level of physical activity.  What are tips for following MyPlate?  To follow MyPlate recommendations:  · Eat a wide variety of fruits and vegetables, grains, and protein foods.  · Serve smaller portions and eat less food throughout the day.  · Limit portion sizes to avoid overeating.  · Enjoy your food.  · Get at least 150 minutes of exercise every week. This is about 30 minutes each day, 5 or more days per week.  It can be difficult to have every meal look like MyPlate. Think about MyPlate as eating guidelines for an entire day, rather than each individual meal.  Fruits and vegetables  · Make half of your plate fruits and vegetables.  · Eat many different colors of fruits and vegetables each day.  · For a 2,000 calorie daily food plan, eat:  ? 2½ cups of vegetables every day.  ? 2 cups of fruit every day.  · 1 cup is equal to:  ? 1 cup raw or cooked vegetables.  ? 1 cup raw fruit.  ? 1 medium-sized orange, apple, or banana.  ? 1 cup 100% fruit or vegetable juice.  ? 2 cups raw leafy greens, such as lettuce, spinach, or kale.  ? ½ cup dried fruit.  Grains  · One fourth of your plate should be grains.  · Make at least half of the grains you eat each day whole grains.  · For a 2,000 calorie daily food plan, eat 6 oz of grains every day.  · 1 oz is equal to:  ? 1 slice bread.  ? 1 cup cereal.  ? ½ cup cooked rice, cereal, or pasta.  Protein  · One fourth of your plate should be protein.  · Eat a wide variety of protein foods, including meat, poultry, fish, eggs, beans, nuts, and tofu.  · For a 2,000 calorie daily food plan, eat 5½ oz of protein every day.  · 1 oz is equal to:  ? 1 oz meat, poultry, or fish.  ? ¼ cup cooked beans.  ? 1 egg.  ? ½ oz nuts  or seeds.  ? 1 Tbsp peanut butter.  Dairy  · Drink fat-free or low-fat (1%) milk.  · Eat or drink dairy as a side to meals.  · For a 2,000 calorie daily food plan, eat or drink 3 cups of dairy every day.  · 1 cup is equal to:  ? 1 cup milk, yogurt, cottage cheese, or soy milk (soy beverage).  ? 2 oz processed cheese.  ? 1½ oz natural cheese.  Fats, oils, salt, and sugars  · Only small amounts of oils are recommended.  · Avoid foods that are high in calories and low in nutritional value (empty calories), like foods high in fat or added sugars.  · Choose foods that are low in salt (sodium). Choose foods that have less than 140 milligrams (mg) of sodium per serving.  · Drink water instead of sugary drinks. Drink enough water each day to keep your urine pale yellow.  Where to find support  · Work with your health care provider or a nutrition specialist (dietitian) to develop a customized eating plan that is right for you.  · Download an mayra (mobile application) to help you track your daily food intake.  Where to find more information  · Go to ChooseMyPlate.gov for more information.  Summary  · MyPlate is a general guideline for healthy eating from the USDA. It is based on the 2010 Dietary Guidelines for Americans.  · In general, fruits and vegetables should take up ½ of your plate, grains should take up ¼ of your plate, and protein should take up ¼ of your plate.  This information is not intended to replace advice given to you by your health care provider. Make sure you discuss any questions you have with your health care provider.  Document Revised: 05/21/2020 Document Reviewed: 03/19/2018  Elsevier Patient Education © 2020 [employer] Inc.

## 2020-12-17 NOTE — PROGRESS NOTES
Subjective   Karen Conteh is a 89 y.o. female.   Follow-up ear problem    History of Present Illness   Patient has a history of long-term deafness with right now essentially deaf she wears a hearing in the left ear she is had a problem cerumen impaction she is not have any pain drainage or recent ear infections ear nose and throat wise according to her family she is doing well other some allergies  Comes in for a routine recheck    The following portions of the patient's history were reviewed and updated as appropriate: allergies, current medications, past family history, past medical history, past social history, past surgical history and problem list.      Current Outpatient Medications:   •  amLODIPine (NORVASC) 5 MG tablet, Take 5 mg by mouth Daily., Disp: , Rfl:   •  aspirin 81 MG chewable tablet, Chew 81 mg Daily., Disp: , Rfl:   •  clopidogrel (PLAVIX) 75 MG tablet, Take 75 mg by mouth Daily., Disp: , Rfl:   •  Cyanocobalamin (B-12) 1000 MCG capsule, Take 1 capsule by mouth Daily., Disp: , Rfl:   •  DEXILANT 60 MG capsule, , Disp: , Rfl:   •  escitalopram (LEXAPRO) 10 MG tablet, Take 10 mg by mouth every night., Disp: , Rfl:   •  latanoprost (XALATAN) 0.005 % ophthalmic solution, Administer 1 drop to both eyes Every Night., Disp: 2.5 mL, Rfl: 11  •  levothyroxine (SYNTHROID, LEVOTHROID) 25 MCG tablet, Take 25 mcg by mouth daily., Disp: , Rfl:   •  levothyroxine (SYNTHROID, LEVOTHROID) 75 MCG tablet, , Disp: , Rfl:   •  losartan (COZAAR) 100 MG tablet, Take 1 tablet by mouth Daily., Disp: 90 tablet, Rfl: 2  •  meloxicam (MOBIC) 7.5 MG tablet, Take 7.5 mg by mouth Daily., Disp: , Rfl:   •  Multiple Vitamins-Minerals (MULTIVITAMIN ADULT PO), Take 1 tablet by mouth Daily., Disp: , Rfl:   •  timolol (TIMOPTIC) 0.5 % ophthalmic solution, Administer 1 drop into the left eye Daily., Disp: , Rfl:   •  acetaminophen-codeine (TYLENOL with CODEINE #3) 300-30 MG per tablet, Take 1 tablet by mouth Every 6 (Six) Hours As  Needed., Disp: , Rfl:   •  famotidine (PEPCID) 40 MG tablet, , Disp: , Rfl:   •  olopatadine (PATADAY) 0.2 % solution ophthalmic solution, Apply  to eye(s) as directed by provider., Disp: , Rfl:   •  pravastatin (PRAVACHOL) 20 MG tablet, Take 20 mg by mouth every night., Disp: , Rfl:     Allergies   Allergen Reactions   • Spironolactone Other (See Comments)     Daughter doesn't remember   • Tetracyclines & Related      SWEATS   • Xarelto [Rivaroxaban] Other (See Comments)     hematoma             Review of Systems   Constitutional: Negative for fever.   HENT: Positive for hearing loss. Negative for ear discharge, ear pain and sore throat.    Allergic/Immunologic: Positive for environmental allergies.   Hematological: Negative for adenopathy.           Objective   Physical Exam  Vitals signs and nursing note reviewed.   HENT:      Head: Normocephalic.      Right Ear: External ear normal.      Left Ear: External ear normal.      Ears:      Comments: Mild scarring eardrums bilateral cerumen impaction right worse than left wears hearing aid in left ear     Nose: Nose normal.      Mouth/Throat:      Mouth: Mucous membranes are moist.   Eyes:      Conjunctiva/sclera: Conjunctivae normal.   Pulmonary:      Effort: Pulmonary effort is normal.   Musculoskeletal:      Comments: Walks with the aid of a walker   Skin:     General: Skin is warm.   Neurological:      General: No focal deficit present.      Mental Status: She is alert.   Psychiatric:         Mood and Affect: Mood normal.     Procedure note bilateral cerumen impaction removal was carried out and cleaned with use of the microscope forceps suction patient tolerated well there are no complications large amount was removed from both ears    Audiogram and actual tracings were reviewed with the patient and family and compared to previous testing her right ear is basic the same there is slight decrease in to scram on the left tympanograms are normal actual tracings were  shown and explained to the family and the patient    Assessment/Plan   Diagnoses and all orders for this visit:    1. Sensorineural hearing loss of both ears (Primary)    2. Impacted cerumen of right ear    3. Bilateral impacted cerumen    Attempt to use peroxide to minimize wax buildup call problems or questions recheck 6 months with Dr. Burch  For follow-up  There call if any acute changes in the meantime  Offered referral for cochlear implant but they do not want to consider that at this point with other issues  They are to call if any change or problems all questions were answered

## 2020-12-17 NOTE — PROGRESS NOTES
STANDARD AUDIOMETRIC EVALUATION      Name:  Karen Conteh  :  [id number]  Age:  89 y.o.  Date of Evaluation:  2020      HISTORY    Reason for visit:  Karen Conteh is seen today for a hearing test at the request of Dr. Brent Diaz.  Patient reports she has hearing loss, and she currently wears an ITC hearing aid in her left ear.  This is a recheck of her ears.       EVALUATION    See Audiogram    RESULTS        Otoscopy and Tympanometry 226 Hz :  Right Ear:  Otoscopy:  Cerumen impaction, Testing completed after ears were cleaned          Tympanometry:  Middle ear function within normal limits    Left Ear:   Otoscopy:  Cerumen impaction, Testing completed after ears were cleaned        Tympanometry:  Middle ear function within normal limits    Test technique:  Standard Audiometry     Pure Tone Audiometry:   Patient responded to pure tones at 45-85 dB for 250-8000 Hz in right ear, and at 35-85 dB for 250-8000 Hz in left ear.       Speech Audiometry:        Right Ear:  Speech Awareness Threshold (SAT) was observed at 55 dBHL                 Speech Discrimination scores were 4% in quiet when words were presented at 85 dBHL       Left Ear:  Speech Reception Threshold (SRT) was obtained at 55 dBHL                 Speech Discrimination scores were 16% in quiet when words were presented at 85 dBHL    Reliability:   good    IMPRESSIONS:  1.  Tympanometry results are consistent with Middle ear function within normal limits in both ears.  2.  Pure tone results are consistent with moderate to severe sloping sensorineural hearing loss  for both ears.       RECOMMENDATIONS:  Patient is seeing the Ear Nose and Throat physician immediately following this examination.  It was a pleasure seeing Karen Conteh in Audiology today.  We would be happy to do further testing or discuss these test as necessary.          This document has been electronically signed by Kayleen Mercado MS CCC-A on 2020  09:57 CST       Kayleen Mercado, MS [facility]  Licensed Audiologist

## 2021-04-02 PROCEDURE — 87086 URINE CULTURE/COLONY COUNT: CPT | Performed by: EMERGENCY MEDICINE

## 2021-06-16 ENCOUNTER — OFFICE VISIT (OUTPATIENT)
Dept: OTOLARYNGOLOGY | Facility: CLINIC | Age: 86
End: 2021-06-16

## 2021-06-16 VITALS — HEART RATE: 79 BPM | WEIGHT: 174 LBS | BODY MASS INDEX: 29.71 KG/M2 | OXYGEN SATURATION: 97 % | HEIGHT: 64 IN

## 2021-06-16 DIAGNOSIS — H60.63 CHRONIC NON-INFECTIVE OTITIS EXTERNA OF BOTH EARS, UNSPECIFIED TYPE: Primary | ICD-10-CM

## 2021-06-16 PROCEDURE — 99213 OFFICE O/P EST LOW 20 MIN: CPT | Performed by: OTOLARYNGOLOGY

## 2021-06-16 NOTE — PROGRESS NOTES
Shawn Conteh is a 89 y.o. female.       History of Present Illness   Former patient of Dr. Diaz with profound hearing loss on the right and significant hearing loss on the left.  Wears a left-sided hearing aid.  Is prone to wax and squamous debris buildup in the ears.  Has not had any otorrhea.      The following portions of the patient's history were reviewed and updated as appropriate: allergies, current medications, past family history, past medical history, past social history, past surgical history and problem list.     reports that she has never smoked. She has never used smokeless tobacco. She reports that she does not drink alcohol and does not use drugs.   Patient is not a tobacco user and has not been counseled for use of tobacco products      Review of Systems        Objective   Physical Exam  Ears: External ears no deformity.  Both ear canals show uninfected squamous debris that is cleaned under the microscope using instrumentation.  Beyond this tympanic membranes are intact with no infection or effusion      Assessment/Plan   Diagnoses and all orders for this visit:    1. Chronic non-infective otitis externa of both ears, unspecified type (Primary)        Plan: Ears cleaned as described above.  Advise return in 6 months.  Call for problems.

## 2022-06-07 ENCOUNTER — OFFICE VISIT (OUTPATIENT)
Dept: OTOLARYNGOLOGY | Facility: CLINIC | Age: 87
End: 2022-06-07

## 2022-06-07 VITALS — OXYGEN SATURATION: 95 % | WEIGHT: 170.4 LBS | HEIGHT: 65 IN | BODY MASS INDEX: 28.39 KG/M2

## 2022-06-07 DIAGNOSIS — H60.63 CHRONIC NON-INFECTIVE OTITIS EXTERNA OF BOTH EARS, UNSPECIFIED TYPE: Primary | ICD-10-CM

## 2022-06-07 PROCEDURE — 99213 OFFICE O/P EST LOW 20 MIN: CPT | Performed by: OTOLARYNGOLOGY

## 2022-06-07 RX ORDER — LEVOTHYROXINE SODIUM 0.07 MG/1
75 TABLET ORAL DAILY
COMMUNITY
Start: 2022-05-27

## 2022-06-07 NOTE — PROGRESS NOTES
Subjective   Karen Conteh is a 89 y.o. female.       History of Present Illness   Hearing aid wearer with a history of chronic noninfective otitis externa called to be worked in to get her ears cleaned because she is apparently going to have some new earmolds made.      The following portions of the patient's history were reviewed and updated as appropriate: allergies, current medications, past family history, past medical history, past social history, past surgical history and problem list.     reports that she has never smoked. She has never used smokeless tobacco. She reports that she does not drink alcohol and does not use drugs.   Patient is not a tobacco user and has not been counseled for use of tobacco products      Review of Systems        Objective   Physical Exam  Both ear canals show a significant amount of uninfected squamous debris that is cleaned under the microscope using instrumentation.  Beyond this tympanic membranes are intact no infection or effusion         Assessment and Plan   Diagnoses and all orders for this visit:    1. Chronic non-infective otitis externa of both ears, unspecified type (Primary)               Plan: Ears cleaned as described above.  Suggested she return in 6 months.  Call sooner for problems.

## 2023-02-06 ENCOUNTER — OFFICE VISIT (OUTPATIENT)
Dept: OTOLARYNGOLOGY | Facility: CLINIC | Age: 88
End: 2023-02-06
Payer: MEDICARE

## 2023-02-06 VITALS — WEIGHT: 167.2 LBS | BODY MASS INDEX: 27.86 KG/M2 | TEMPERATURE: 97 F | HEIGHT: 65 IN

## 2023-02-06 DIAGNOSIS — H60.63 CHRONIC NON-INFECTIVE OTITIS EXTERNA OF BOTH EARS, UNSPECIFIED TYPE: Primary | ICD-10-CM

## 2023-02-06 PROCEDURE — 99213 OFFICE O/P EST LOW 20 MIN: CPT | Performed by: OTOLARYNGOLOGY

## 2023-02-06 NOTE — PROGRESS NOTES
Subjective   Karen Conteh is a 89 y.o. female.       History of Present Illness   Hearing aid wearer followed with chronic noninfective otitis externa.  Says she feels like she cannot hear even with her hearing aids      The following portions of the patient's history were reviewed and updated as appropriate: allergies, current medications, past family history, past medical history, past social history, past surgical history and problem list.     reports that she has never smoked. She has never used smokeless tobacco. She reports that she does not drink alcohol and does not use drugs.   Patient is not a tobacco user and has not been counseled for use of tobacco products      Review of Systems        Objective   Physical Exam    Right greater than left ear canal show a large amount of uninfected squamous debris that is cleaned under the microscope.  Tympanic membranes are intact no infection or effusion       Assessment and Plan   Diagnoses and all orders for this visit:    1. Chronic non-infective otitis externa of both ears, unspecified type (Primary)               Plan: Ears cleaned as described above.  Return in 6 months.  Call for problems.

## 2023-08-07 ENCOUNTER — OFFICE VISIT (OUTPATIENT)
Dept: OTOLARYNGOLOGY | Facility: CLINIC | Age: 88
End: 2023-08-07
Payer: MEDICARE

## 2023-08-07 VITALS — HEIGHT: 65 IN | WEIGHT: 158.2 LBS | OXYGEN SATURATION: 97 % | BODY MASS INDEX: 26.36 KG/M2

## 2023-08-07 DIAGNOSIS — Z53.21 PROCEDURE AND TREATMENT NOT CARRIED OUT DUE TO PATIENT LEAVING PRIOR TO BEING SEEN BY HEALTH CARE PROVIDER: Primary | ICD-10-CM

## 2023-08-07 RX ORDER — HYDROCODONE BITARTRATE AND ACETAMINOPHEN 5; 325 MG/1; MG/1
TABLET ORAL
COMMUNITY
Start: 2023-05-30

## 2023-08-07 NOTE — PROGRESS NOTES
"Patient had an appointment for reevaluation of chronic otitis externa.  Was placed in a room by staff.  When I exited another exam room the patient and her family member were leaving office.  I was informed by staff that the patient's family member thought the patient was having a \"mini stroke\".  Family member was advised to take the patient to the emergency room, and apparently declined but still left the office without seeing me.  "

## 2023-09-18 ENCOUNTER — OFFICE VISIT (OUTPATIENT)
Dept: OTOLARYNGOLOGY | Facility: CLINIC | Age: 88
End: 2023-09-18
Payer: MEDICARE

## 2023-09-18 VITALS — WEIGHT: 160.6 LBS | BODY MASS INDEX: 26.76 KG/M2 | HEIGHT: 65 IN

## 2023-09-18 DIAGNOSIS — H60.63 CHRONIC NON-INFECTIVE OTITIS EXTERNA OF BOTH EARS, UNSPECIFIED TYPE: Primary | ICD-10-CM

## 2023-09-18 PROCEDURE — 1159F MED LIST DOCD IN RCRD: CPT | Performed by: OTOLARYNGOLOGY

## 2023-09-18 PROCEDURE — 1160F RVW MEDS BY RX/DR IN RCRD: CPT | Performed by: OTOLARYNGOLOGY

## 2023-09-18 PROCEDURE — 99213 OFFICE O/P EST LOW 20 MIN: CPT | Performed by: OTOLARYNGOLOGY

## 2023-09-18 NOTE — PROGRESS NOTES
Subjective   Karen Conteh is a 89 y.o. female.       History of Present Illness   Hearing aid wearer with a history of chronic otitis externa says her hearing is decreased even with her hearing aids      The following portions of the patient's history were reviewed and updated as appropriate: allergies, current medications, past family history, past medical history, past social history, past surgical history and problem list.     reports that she has never smoked. She has never used smokeless tobacco. She reports that she does not drink alcohol and does not use drugs.   Patient is not a tobacco user and has not been counseled for use of tobacco products      Review of Systems        Objective   Physical Exam  Both ear canals show uninfected squamous debris that is cleaned under the microscope using instrumentation.  Beyond this tympanic membranes are intact no infection or effusion.  After cleaning she put her hearing aids in and noted improvement         Assessment and Plan   Diagnoses and all orders for this visit:    1. Chronic non-infective otitis externa of both ears, unspecified type (Primary)             Plan: Ears cleaned as described above.  Return in 6 months.  Call for problems.

## 2024-09-16 NOTE — PROGRESS NOTES
Subjective   Karen Conteh is a 89 y.o. female.   Follow-up cerumen impaction and hearing loss  History of Present Illness   Patient was known hearing loss noted from the cerumen impaction wears a hearing in the left ear comes back for recheck her family and she said that they think maybe slightly worse than his no pain or drainage      The following portions of the patient's history were reviewed and updated as appropriate: allergies, current medications, past family history, past medical history, past social history, past surgical history and problem list.      Social History:   Lives with family      Family History   Problem Relation Age of Onset   • Cancer Other    • Diabetes Other    • Heart disease Other    • Hypertension Other          Current Outpatient Prescriptions:   •  aspirin 81 MG chewable tablet, Chew 81 mg Daily., Disp: , Rfl:   •  clopidogrel (PLAVIX) 75 MG tablet, Take 1 tablet by mouth Daily., Disp: 30 tablet, Rfl: 11  •  clotrimazole-betamethasone (LOTRISONE) 1-0.05 % cream, Apply  topically 2 (Two) Times a Day., Disp: , Rfl:   •  Dexlansoprazole (DEXILANT PO), Take 60 mg by mouth Daily., Disp: , Rfl:   •  escitalopram (LEXAPRO) 10 MG tablet, Take 10 mg by mouth every night., Disp: , Rfl:   •  ferrous sulfate 325 (65 FE) MG tablet, Take 325 mg by mouth daily., Disp: , Rfl:   •  fluticasone (FLONASE) 50 MCG/ACT nasal spray, into each nostril. Administer 2 sprays in each nostril for each dose., Disp: , Rfl:   •  latanoprost (XALATAN) 0.005 % ophthalmic solution, Administer 1 drop to both eyes Every Night., Disp: 2.5 mL, Rfl: 11  •  levothyroxine (SYNTHROID, LEVOTHROID) 25 MCG tablet, Take 25 mcg by mouth daily., Disp: , Rfl:   •  LORazepam (ATIVAN) 1 MG tablet, Take 1 mg by mouth 2 (two) times a day as needed for anxiety., Disp: , Rfl:   •  Multiple Vitamins-Minerals (MULTIVITAMIN ADULT PO), Take  by mouth., Disp: , Rfl:   •  olopatadine (PATADAY) 0.2 % solution ophthalmic solution, 1 drop  daily., Disp: , Rfl:   •  pravastatin (PRAVACHOL) 20 MG tablet, Take 20 mg by mouth every night., Disp: , Rfl:   •  ranitidine (ZANTAC) 150 MG tablet, Take 150 mg by mouth 2 (two) times a day., Disp: , Rfl:   •  timolol (TIMOPTIC) 0.5 % ophthalmic solution, Administer 1 drop into the left eye Daily., Disp: , Rfl:   •  valsartan (DIOVAN) 80 MG tablet, Take 80 mg by mouth daily., Disp: , Rfl:     Allergies   Allergen Reactions   • Spironolactone Other (See Comments)     Daughter doesn't remember   • Tetracyclines & Related      SWEATS   • Xarelto [Rivaroxaban] Other (See Comments)     hematoma            Past Medical History:   Diagnosis Date   • Achilles tendinitis    • Acute atopic conjunctivitis    • Artificial lens in position    • Calcaneal spur    • Equinus contracture of the ankle    • Essential hypertension    • Fibrocystic disease of breast    • Gastroesophageal reflux disease    • Glaucoma    • Hypernatremia    • Keratoconjunctivitis sicca    • Myopia    • needs influenza immunization    • Nonexudative age-related macular degeneration    • Nuclear cataract    • Osteoporosis    • Plantar fasciitis    • Primary open angle glaucoma    • Pseudophakia    • Stroke     ministrokes   • 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   HENT: Positive for hearing loss. Negative for congestion, ear discharge and ear pain.    Hematological: Negative for adenopathy.           Objective   Physical Exam   Constitutional: She is oriented to person, place, and time. She appears well-developed and well-nourished.   HENT:   Head: Normocephalic and atraumatic.   Right Ear: Tympanic membrane and external ear normal.   Left Ear: Tympanic membrane and external ear normal.   Ears:    Nose: Nose normal. No mucosal edema, rhinorrhea, nasal deformity or septal deviation. No epistaxis. Right sinus exhibits no maxillary sinus tenderness and no frontal sinus tenderness. Left  sinus exhibits no maxillary sinus tenderness and no frontal sinus tenderness.   Mouth/Throat: Uvula is midline, oropharynx is clear and moist and mucous membranes are normal. No trismus in the jaw. Normal dentition. No oropharyngeal exudate or posterior oropharyngeal edema. Tonsils are 1+ on the right. Tonsils are 1+ on the left. No tonsillar exudate.   Eyes: Conjunctivae are normal.   Neck: Normal range of motion. Neck supple. No JVD present. No tracheal deviation present. No thyromegaly present.   Pulmonary/Chest: Effort normal.   Musculoskeletal: Normal range of motion.   Lymphadenopathy:        Head (right side): No submental, no submandibular, no tonsillar, no preauricular, no posterior auricular and no occipital adenopathy present.        Head (left side): No submental, no submandibular, no tonsillar, no preauricular, no posterior auricular and no occipital adenopathy present.     She has no cervical adenopathy.        Right cervical: No superficial cervical, no deep cervical and no posterior cervical adenopathy present.       Left cervical: No superficial cervical, no deep cervical and no posterior cervical adenopathy present.   Neurological: She is alert and oriented to person, place, and time. No cranial nerve deficit.   Skin: Skin is warm.   Psychiatric: She has a normal mood and affect. Her speech is normal and behavior is normal. Thought content normal.   Nursing note and vitals reviewed.      Tolerated ear cleaning well without evidence complication reviewed her situation in her hearing clean the large amount of wax in the right ear with use of microscope various sinus forceps and curettes she tolerated it well without evidence complication           Assessment/Plan   Karen was seen today for follow-up.    Diagnoses and all orders for this visit:    Impacted cerumen of right ear    Sensorineural hearing loss of both ears        Discussed strategies keep the wax from building up   We discussed hearing  protection   follow-up hearing testing 6 months   Call if questions or problems in the meantime                   no

## (undated) DEVICE — CANN SMPL SOFTECH BIFLO ETCO2 A/M 7FT